# Patient Record
Sex: MALE | Race: BLACK OR AFRICAN AMERICAN | Employment: PART TIME | URBAN - METROPOLITAN AREA
[De-identification: names, ages, dates, MRNs, and addresses within clinical notes are randomized per-mention and may not be internally consistent; named-entity substitution may affect disease eponyms.]

---

## 2019-03-17 ENCOUNTER — HOSPITAL ENCOUNTER (EMERGENCY)
Age: 29
Discharge: HOME | End: 2019-03-17

## 2019-03-17 VITALS
HEIGHT: 73 IN | TEMPERATURE: 97.6 F | WEIGHT: 270 LBS | RESPIRATION RATE: 20 BRPM | BODY MASS INDEX: 35.78 KG/M2 | HEART RATE: 63 BPM | DIASTOLIC BLOOD PRESSURE: 95 MMHG | SYSTOLIC BLOOD PRESSURE: 141 MMHG | OXYGEN SATURATION: 98 %

## 2019-03-17 DIAGNOSIS — K02.9 PAIN DUE TO DENTAL CARIES: ICD-10-CM

## 2019-03-17 DIAGNOSIS — K08.89 PAIN, DENTAL: Primary | ICD-10-CM

## 2019-03-17 PROCEDURE — 99282 EMERGENCY DEPT VISIT SF MDM: CPT

## 2019-03-17 PROCEDURE — 6370000000 HC RX 637 (ALT 250 FOR IP): Performed by: NURSE PRACTITIONER

## 2019-03-17 RX ORDER — AMOXICILLIN 250 MG/1
500 CAPSULE ORAL ONCE
Status: COMPLETED | OUTPATIENT
Start: 2019-03-17 | End: 2019-03-17

## 2019-03-17 RX ORDER — IBUPROFEN 800 MG/1
800 TABLET ORAL EVERY 6 HOURS PRN
Qty: 20 TABLET | Refills: 3 | Status: SHIPPED | OUTPATIENT
Start: 2019-03-17 | End: 2022-03-10 | Stop reason: ALTCHOICE

## 2019-03-17 RX ORDER — IBUPROFEN 800 MG/1
800 TABLET ORAL ONCE
Status: COMPLETED | OUTPATIENT
Start: 2019-03-17 | End: 2019-03-17

## 2019-03-17 RX ORDER — AMOXICILLIN 500 MG/1
500 CAPSULE ORAL 3 TIMES DAILY
Qty: 21 CAPSULE | Refills: 0 | Status: SHIPPED | OUTPATIENT
Start: 2019-03-17 | End: 2019-03-24

## 2019-03-17 RX ADMIN — LIDOCAINE HYDROCHLORIDE 15 ML: 20 SOLUTION ORAL; TOPICAL at 06:13

## 2019-03-17 RX ADMIN — AMOXICILLIN 500 MG: 250 CAPSULE ORAL at 06:12

## 2019-03-17 RX ADMIN — IBUPROFEN 800 MG: 800 TABLET, FILM COATED ORAL at 05:57

## 2019-03-17 ASSESSMENT — PAIN SCALES - GENERAL
PAINLEVEL_OUTOF10: 10
PAINLEVEL_OUTOF10: 5
PAINLEVEL_OUTOF10: 4

## 2019-03-17 ASSESSMENT — PAIN DESCRIPTION - LOCATION
LOCATION: TEETH
LOCATION: TEETH

## 2019-03-17 ASSESSMENT — PAIN DESCRIPTION - PROGRESSION: CLINICAL_PROGRESSION: GRADUALLY IMPROVING

## 2019-03-17 ASSESSMENT — PAIN DESCRIPTION - PAIN TYPE: TYPE: ACUTE PAIN

## 2019-03-17 ASSESSMENT — PAIN DESCRIPTION - FREQUENCY: FREQUENCY: CONTINUOUS

## 2019-03-17 ASSESSMENT — PAIN DESCRIPTION - DESCRIPTORS
DESCRIPTORS: SHARP
DESCRIPTORS: SHARP;STABBING

## 2020-09-17 ENCOUNTER — ANESTHESIA (OUTPATIENT)
Dept: OPERATING ROOM | Age: 30
DRG: 395 | End: 2020-09-17

## 2020-09-17 ENCOUNTER — HOSPITAL ENCOUNTER (INPATIENT)
Age: 30
LOS: 2 days | Discharge: HOME | DRG: 395 | End: 2020-09-19
Attending: EMERGENCY MEDICINE | Admitting: SURGERY

## 2020-09-17 ENCOUNTER — ANESTHESIA EVENT (OUTPATIENT)
Dept: OPERATING ROOM | Age: 30
DRG: 395 | End: 2020-09-17

## 2020-09-17 ENCOUNTER — APPOINTMENT (OUTPATIENT)
Dept: CT IMAGING | Age: 30
DRG: 395 | End: 2020-09-17

## 2020-09-17 PROBLEM — Z76.89 ENCOUNTER FOR INCISION AND DRAINAGE PROCEDURE: Status: ACTIVE | Noted: 2020-09-17

## 2020-09-17 LAB
ANION GAP SERPL CALCULATED.3IONS-SCNC: 6 MMOL/L (ref 7–16)
BASOPHILS ABSOLUTE: 0.04 E9/L (ref 0–0.2)
BASOPHILS RELATIVE PERCENT: 0.3 % (ref 0–2)
BUN BLDV-MCNC: 15 MG/DL (ref 6–20)
CALCIUM SERPL-MCNC: 10.3 MG/DL (ref 8.6–10.2)
CHLORIDE BLD-SCNC: 102 MMOL/L (ref 98–107)
CO2: 29 MMOL/L (ref 22–29)
CREAT SERPL-MCNC: 1.6 MG/DL (ref 0.7–1.2)
EOSINOPHILS ABSOLUTE: 0.04 E9/L (ref 0.05–0.5)
EOSINOPHILS RELATIVE PERCENT: 0.3 % (ref 0–6)
GFR AFRICAN AMERICAN: >60
GFR NON-AFRICAN AMERICAN: >60 ML/MIN/1.73
GLUCOSE BLD-MCNC: 99 MG/DL (ref 74–99)
HCT VFR BLD CALC: 47.7 % (ref 37–54)
HEMOGLOBIN: 15.8 G/DL (ref 12.5–16.5)
IMMATURE GRANULOCYTES #: 0.06 E9/L
IMMATURE GRANULOCYTES %: 0.5 % (ref 0–5)
LYMPHOCYTES ABSOLUTE: 1.83 E9/L (ref 1.5–4)
LYMPHOCYTES RELATIVE PERCENT: 15.7 % (ref 20–42)
MCH RBC QN AUTO: 30.6 PG (ref 26–35)
MCHC RBC AUTO-ENTMCNC: 33.1 % (ref 32–34.5)
MCV RBC AUTO: 92.4 FL (ref 80–99.9)
MONOCYTES ABSOLUTE: 1.09 E9/L (ref 0.1–0.95)
MONOCYTES RELATIVE PERCENT: 9.4 % (ref 2–12)
NEUTROPHILS ABSOLUTE: 8.56 E9/L (ref 1.8–7.3)
NEUTROPHILS RELATIVE PERCENT: 73.8 % (ref 43–80)
PDW BLD-RTO: 12.8 FL (ref 11.5–15)
PLATELET # BLD: 176 E9/L (ref 130–450)
PMV BLD AUTO: 10.5 FL (ref 7–12)
POTASSIUM REFLEX MAGNESIUM: 4.6 MMOL/L (ref 3.5–5)
RBC # BLD: 5.16 E12/L (ref 3.8–5.8)
SODIUM BLD-SCNC: 137 MMOL/L (ref 132–146)
WBC # BLD: 11.6 E9/L (ref 4.5–11.5)

## 2020-09-17 PROCEDURE — 6360000004 HC RX CONTRAST MEDICATION: Performed by: RADIOLOGY

## 2020-09-17 PROCEDURE — 6360000002 HC RX W HCPCS: Performed by: STUDENT IN AN ORGANIZED HEALTH CARE EDUCATION/TRAINING PROGRAM

## 2020-09-17 PROCEDURE — 2580000003 HC RX 258: Performed by: STUDENT IN AN ORGANIZED HEALTH CARE EDUCATION/TRAINING PROGRAM

## 2020-09-17 PROCEDURE — 87040 BLOOD CULTURE FOR BACTERIA: CPT

## 2020-09-17 PROCEDURE — 80048 BASIC METABOLIC PNL TOTAL CA: CPT

## 2020-09-17 PROCEDURE — 96374 THER/PROPH/DIAG INJ IV PUSH: CPT

## 2020-09-17 PROCEDURE — 2580000003 HC RX 258: Performed by: SURGERY

## 2020-09-17 PROCEDURE — 99284 EMERGENCY DEPT VISIT MOD MDM: CPT

## 2020-09-17 PROCEDURE — 99285 EMERGENCY DEPT VISIT HI MDM: CPT

## 2020-09-17 PROCEDURE — 85025 COMPLETE CBC W/AUTO DIFF WBC: CPT

## 2020-09-17 PROCEDURE — 74177 CT ABD & PELVIS W/CONTRAST: CPT

## 2020-09-17 PROCEDURE — 1200000000 HC SEMI PRIVATE

## 2020-09-17 RX ORDER — SODIUM CHLORIDE 0.9 % (FLUSH) 0.9 %
10 SYRINGE (ML) INJECTION EVERY 12 HOURS SCHEDULED
Status: DISCONTINUED | OUTPATIENT
Start: 2020-09-17 | End: 2020-09-19

## 2020-09-17 RX ORDER — SODIUM CHLORIDE 9 MG/ML
INJECTION, SOLUTION INTRAVENOUS EVERY 8 HOURS
Status: DISCONTINUED | OUTPATIENT
Start: 2020-09-17 | End: 2020-09-19

## 2020-09-17 RX ORDER — SODIUM CHLORIDE 0.9 % (FLUSH) 0.9 %
10 SYRINGE (ML) INJECTION PRN
Status: DISCONTINUED | OUTPATIENT
Start: 2020-09-17 | End: 2020-09-19

## 2020-09-17 RX ORDER — ONDANSETRON 2 MG/ML
4 INJECTION INTRAMUSCULAR; INTRAVENOUS EVERY 6 HOURS PRN
Status: DISCONTINUED | OUTPATIENT
Start: 2020-09-17 | End: 2020-09-19

## 2020-09-17 RX ORDER — ACETAMINOPHEN 650 MG
TABLET, EXTENDED RELEASE ORAL PRN
Status: DISCONTINUED | OUTPATIENT
Start: 2020-09-17 | End: 2020-09-17

## 2020-09-17 RX ORDER — KETOROLAC TROMETHAMINE 30 MG/ML
30 INJECTION, SOLUTION INTRAMUSCULAR; INTRAVENOUS ONCE
Status: COMPLETED | OUTPATIENT
Start: 2020-09-17 | End: 2020-09-17

## 2020-09-17 RX ORDER — FENTANYL CITRATE 50 UG/ML
100 INJECTION, SOLUTION INTRAMUSCULAR; INTRAVENOUS
Status: DISCONTINUED | OUTPATIENT
Start: 2020-09-17 | End: 2020-09-17

## 2020-09-17 RX ORDER — OXYCODONE HYDROCHLORIDE 5 MG/1
5 TABLET ORAL EVERY 4 HOURS PRN
Status: DISCONTINUED | OUTPATIENT
Start: 2020-09-17 | End: 2020-09-19

## 2020-09-17 RX ORDER — ACETAMINOPHEN 325 MG/1
650 TABLET ORAL EVERY 4 HOURS PRN
Status: DISCONTINUED | OUTPATIENT
Start: 2020-09-17 | End: 2020-09-19 | Stop reason: HOSPADM

## 2020-09-17 RX ORDER — 0.9 % SODIUM CHLORIDE 0.9 %
1000 INTRAVENOUS SOLUTION INTRAVENOUS ONCE
Status: COMPLETED | OUTPATIENT
Start: 2020-09-17 | End: 2020-09-17

## 2020-09-17 RX ORDER — LIDOCAINE HYDROCHLORIDE AND EPINEPHRINE 10; 10 MG/ML; UG/ML
20 INJECTION, SOLUTION INFILTRATION; PERINEURAL ONCE
Status: DISCONTINUED | OUTPATIENT
Start: 2020-09-17 | End: 2020-09-17

## 2020-09-17 RX ORDER — OXYCODONE HYDROCHLORIDE 5 MG/1
10 TABLET ORAL EVERY 4 HOURS PRN
Status: DISCONTINUED | OUTPATIENT
Start: 2020-09-17 | End: 2020-09-19

## 2020-09-17 RX ADMIN — VANCOMYCIN HYDROCHLORIDE 3000 MG: 1 INJECTION, POWDER, LYOPHILIZED, FOR SOLUTION INTRAVENOUS at 22:48

## 2020-09-17 RX ADMIN — PIPERACILLIN AND TAZOBACTAM 3.38 G: 3; .375 INJECTION, POWDER, FOR SOLUTION INTRAVENOUS at 17:59

## 2020-09-17 RX ADMIN — SODIUM CHLORIDE 1000 ML: 9 INJECTION, SOLUTION INTRAVENOUS at 13:55

## 2020-09-17 RX ADMIN — SODIUM CHLORIDE: 9 INJECTION, SOLUTION INTRAVENOUS at 21:56

## 2020-09-17 RX ADMIN — IOPAMIDOL 110 ML: 755 INJECTION, SOLUTION INTRAVENOUS at 14:46

## 2020-09-17 RX ADMIN — KETOROLAC TROMETHAMINE 30 MG: 30 INJECTION, SOLUTION INTRAMUSCULAR at 13:55

## 2020-09-17 ASSESSMENT — PAIN DESCRIPTION - PROGRESSION: CLINICAL_PROGRESSION: NOT CHANGED

## 2020-09-17 ASSESSMENT — ENCOUNTER SYMPTOMS
ABDOMINAL PAIN: 0
SINUS PRESSURE: 0
NAUSEA: 0
COUGH: 0
EYE DISCHARGE: 0
BACK PAIN: 0
SHORTNESS OF BREATH: 0
COLOR CHANGE: 1
EYE PAIN: 0
WHEEZING: 0
VOMITING: 0
SORE THROAT: 0
EYE REDNESS: 0
DIARRHEA: 0

## 2020-09-17 ASSESSMENT — PAIN DESCRIPTION - ORIENTATION: ORIENTATION: MID

## 2020-09-17 ASSESSMENT — PAIN SCALES - GENERAL
PAINLEVEL_OUTOF10: 10
PAINLEVEL_OUTOF10: 5
PAINLEVEL_OUTOF10: 10

## 2020-09-17 ASSESSMENT — PAIN DESCRIPTION - PAIN TYPE
TYPE: ACUTE PAIN
TYPE: ACUTE PAIN

## 2020-09-17 ASSESSMENT — PAIN DESCRIPTION - FREQUENCY: FREQUENCY: INTERMITTENT

## 2020-09-17 ASSESSMENT — PAIN DESCRIPTION - DESCRIPTORS: DESCRIPTORS: ACHING;DISCOMFORT

## 2020-09-17 ASSESSMENT — PAIN - FUNCTIONAL ASSESSMENT: PAIN_FUNCTIONAL_ASSESSMENT: ACTIVITIES ARE NOT PREVENTED

## 2020-09-17 ASSESSMENT — LIFESTYLE VARIABLES: SMOKING_STATUS: 1

## 2020-09-17 ASSESSMENT — PAIN DESCRIPTION - LOCATION: LOCATION: RECTUM

## 2020-09-17 ASSESSMENT — PAIN DESCRIPTION - ONSET: ONSET: ON-GOING

## 2020-09-17 NOTE — H&P
GENERAL SURGERY  HISTORY AND PHYSICAL  9/17/2020    Chief Complaint   Patient presents with    Wound Check     groin, testical area       HPI  Yumiko Gomez is a 27 y.o. male who presents for evaluation of rectal pain. He states that 2 days ago he started to have pain between his rectum and scrotum. He describes the pain as pressure like without radiation. Denies any nausea or vomiting, fever, chills, changes in bowel habits. He does report that bowel movements have been painful since this began. He has never had anything like this before. He denies any blood or drainage from the site. He has never had any abdominal surgeries and is not on any anticoagulation. He has no family history of Crohn's, ulcerative colitis, or colon cancer. History reviewed. No pertinent past medical history. History reviewed. No pertinent surgical history. Prior to Admission medications    Medication Sig Start Date End Date Taking? Authorizing Provider   ibuprofen (ADVIL;MOTRIN) 800 MG tablet Take 1 tablet by mouth every 6 hours as needed for Pain 3/17/19 3/22/19  LOLA Domínguez CNP       No Known Allergies    History reviewed. No pertinent family history.     Social History     Tobacco Use    Smoking status: Current Some Day Smoker     Types: Cigarettes    Smokeless tobacco: Never Used    Tobacco comment: 1 pack per week   Substance Use Topics    Alcohol use: Yes     Comment: Socially    Drug use: Yes     Types: Marijuana         Review of Systems - History obtained from the patient  General ROS: negative  Psychological ROS: negative  Ophthalmic ROS: negative  ENT ROS: negative  Allergy and Immunology ROS: negative  Hematological and Lymphatic ROS: negative  Endocrine ROS: negative  Respiratory ROS: negative  Cardiovascular ROS: negative  Gastrointestinal ROS: As above  Genito-Urinary ROS: negative  Musculoskeletal ROS: negative  Neurological ROS: negative  Dermatological ROS: As above      PHYSICAL EXAM:    Vitals:    20 1748   BP: (!) 155/87   Pulse: 61   Resp: 16   Temp: 98 °F (36.7 °C)   SpO2: 98%       General Appearance:  awake, alert, oriented, in no acute distress  Head/face:  NCAT  Eyes:  No gross abnormalities. Lungs:  CTAB  Heart:  Normotensive and RR  Abdomen:  Soft, non-tender, non-distended  Extremities: Extremities warm to touch, pink, with no edema. Male Rectal:  Firm, indurated area, without fluctuance, anterior to anal canal at 12 o'clock position, about 7cm in diameter with no scrotal or anal involvement    LABS:  CBC  Recent Labs     20  1342   WBC 11.6*   HGB 15.8   HCT 47.7        BMP  Recent Labs     20  1342      K 4.6      CO2 29   BUN 15   CREATININE 1.6*   CALCIUM 10.3*     Liver Function  No results for input(s): AMYLASE, LIPASE, BILITOT, BILIDIR, AST, ALT, ALKPHOS, PROT, LABALBU in the last 72 hours. No results for input(s): LACTATE in the last 72 hours. No results for input(s): INR, PTT in the last 72 hours. Invalid input(s): PT    RADIOLOGY    Ct Abdomen Pelvis W Iv Contrast Additional Contrast? None    Result Date: 2020  Patient MRN:  [id number] : 1990 Age: 27 years Gender: Male Order Date:  2020 2:46 PM EXAM: CT ABDOMEN PELVIS W IV CONTRAST INDICATION:  concern for perineal/lower gluteal soft tissue infection. TECHNIQUE: Axial images from the lung bases to the proximal thighs with IV contrast, without oral contrast. Axial sagittal and coronal 2-D reconstructions in soft tissue windows. Limited axial lung windows. Dose reduction techniques without automated exposure control. Contrast is 110 mL Isovue-370 IV. Dose is total DLP 1241.46 MG Y CM. COMPARISON: None FINDINGS:  Lung bases: Clear. No effusion. Heart size normal. No hiatal hernia. ABDOMEN: The liver has minimal fatty change along the falciform ligament. Otherwise normal liver, gallbladder, pancreas, spleen, adrenal glands. The common bile duct measures 3 mm.  The

## 2020-09-17 NOTE — PROGRESS NOTES
Pharmacy Consultation Note  (Antibiotic Dosing and Monitoring)    Initial consult date: 20  Consulting physician: Dr. Ely Spurling  Drug: Vancomycin      Age/  Gender Height Weight IBW Dosing weight  Allergy Information   30 y.o./male 6' 4\" (193 cm) 260 lb (117.9 kg)     Ideal body weight: 86.8 kg (191 lb 5.7 oz)  Adjusted ideal body weight: 99.3 kg (218 lb 13 oz)  117.9 kg  Patient has no known allergies. Temp (24hrs), Av.4 °F (36.9 °C), Min:98.4 °F (36.9 °C), Max:98.4 °F (36.9 °C)          Date  WBC BUN SCr CrCl  (mL/min) Drug/Dose Time   Given Level(s)   (Time) Comments   20 11.6 15 1.6  95  Vancomycin 3000 mg IV x one then 1750 mg q12h                                           No intake or output data in the 24 hours ending 20 1731    Historical Cultures:  No results found for: ORG  No results for input(s): BC in the last 72 hours. Cultures:  available culture and sensitivity results were reviewed in [facility]    Assessment:  · 27 y.o. male has been initiated Vancomycin.   · Estimated CrCl = 95 mL/min  · Goal trough level = 15-20 mcg/mL    Plan:  · Will initiate vancomycin at a dose of 3000 mg x one then 1750 mg q12h  · Monitor renal function   · Clinical pharmacy to follow      Vania Oleary [id number] [facility] 2020 5:31 PM

## 2020-09-17 NOTE — ED NOTES
Spoke to [employer], verified sbar received. Pt chimed.      Katie Iyer, RN  09/17/20 [id number]

## 2020-09-17 NOTE — LETTER
[address] [place] [address]  Phone: [phone]    No name on file. September 19, 2020     Patient: Estella Yan   YOB: 1990   Date of Visit: 9/17/2020       To Whom It May Concern: It is my medical opinion that Camilla Fink should remain out of work until seen by Dr. Magaly Pablo in office next week. .    If you have any questions or concerns, please don't hesitate to call.     Sincerely,        Dr. Magaly Pablo

## 2020-09-17 NOTE — ED PROVIDER NOTES
[facility]  Department of Emergency Medicine     Written by: Perez Rodriguez DO  Patient Name: Genaro Archibald  Attending Provider: Chi Alonso DO  Admit Date: 2020 12:45 PM  MRN: [id number]                   : 1990        Chief Complaint   Patient presents with    Wound Check     groin, testical area    - Chief complaint    Patient is 28-year-old male no sniffing past medical history. Patient is presenting with 4-day history of swelling and pain to the base of his left gluteal cleft/perineal area. Patient states the pain began gradually on Saturday but continued to worsen throughout the week. He notes that there is some fullness and redness to the area as well. He denies any trauma to the area. Patient states that there is no discharge from area. In addition patient states that he has been tolerating food and drink well. Also denies any fevers, chills, nausea, vomiting, chest pain, cough or shortness of breath. The history is provided by the patient. No  was used. Review of Systems   Constitutional: Negative for chills and fever. HENT: Negative for ear pain, sinus pressure and sore throat. Eyes: Negative for pain, discharge and redness. Respiratory: Negative for cough, shortness of breath and wheezing. Cardiovascular: Negative for chest pain. Gastrointestinal: Negative for abdominal pain, diarrhea, nausea and vomiting. Genitourinary: Negative for dysuria and frequency. Musculoskeletal: Negative for arthralgias and back pain. Skin: Positive for color change. Negative for rash and wound. Swelling and pain to base of left gluteal cleft/perineal area   Neurological: Negative for weakness and headaches. Hematological: Negative for adenopathy. All other systems reviewed and are negative. Physical Exam  Vitals signs and nursing note reviewed. Constitutional:       Appearance: He is well-developed.    HENT: Head: Normocephalic and atraumatic. Eyes:      Conjunctiva/sclera: Conjunctivae normal.   Neck:      Musculoskeletal: Normal range of motion and neck supple. Cardiovascular:      Rate and Rhythm: Normal rate and regular rhythm. Heart sounds: Normal heart sounds. No murmur. Pulmonary:      Effort: Pulmonary effort is normal. No respiratory distress. Breath sounds: Normal breath sounds. No wheezing or rales. Abdominal:      General: Bowel sounds are normal.      Palpations: Abdomen is soft. Tenderness: There is no abdominal tenderness. There is no guarding or rebound. Musculoskeletal:         General: No tenderness or deformity. Skin:     General: Skin is warm and dry. Findings: Erythema present. Comments: 3 x 3 cm area of redness and swelling to the base of the left gluteal cleft/perineal area. No discharge expressed. No definitive area of fluctuance. Neurological:      Mental Status: He is alert and oriented to person, place, and time. Cranial Nerves: No cranial nerve deficit. Coordination: Coordination normal.          Procedures       MDM  Number of Diagnoses or Management Options  Perianal abscess:   Diagnosis management comments: Patient is 27-year-old male no symptom past medical history. Patient presented with perianal pain and swelling for the last few days. Vital signs stable on presentation. On physical exam area of induration and tenderness at the base of the gluteal cleft. No drainage noted. Lab work was obtained which demonstrated mildly elevated creatinine of 1.6 mild elevated leukocytosis of 11.9. CT scan of the abdomen pelvis with contrast is performed which demonstrated a 5 x 5 x 2 cm perianal abscess. Call placed to general surgery who came and evaluated patient. Recommendation was made to admit patient to surgical team.  Plan of care discussed with patient, all questions were answered.   Patient was agreement with admission and was admitted in stable condition. Amount and/or Complexity of Data Reviewed  Clinical lab tests: ordered and reviewed  Tests in the radiology section of CPT®: ordered and reviewed    Risk of Complications, Morbidity, and/or Mortality  Presenting problems: moderate  Diagnostic procedures: moderate  Management options: moderate    Patient Progress  Patient progress: stable       ED Course as of Sep 17 1815   Thu Sep 17, 2020   1631 Consult placed to general surgery, return call from Dr. Stacy Mckeon. General surgery resident to evaluate patient. [BP]      ED Course User Index  [BP] Sonny Jonas DO        --------------------------------------------- PAST HISTORY ---------------------------------------------  Past Medical History:  has no past medical history on file. Past Surgical History:  has no past surgical history on file. Social History:  reports that he has been smoking cigarettes. He has never used smokeless tobacco. He reports current alcohol use. He reports current drug use. Drug: Marijuana. Family History: family history is not on file. The patients home medications have been reviewed. Allergies: Patient has no known allergies.     -------------------------------------------------- RESULTS -------------------------------------------------    LABS:  Results for orders placed or performed during the hospital encounter of 09/17/20   CBC Auto Differential   Result Value Ref Range    WBC 11.6 (H) 4.5 - 11.5 E9/L    RBC 5.16 3.80 - 5.80 E12/L    Hemoglobin 15.8 12.5 - 16.5 g/dL    Hematocrit 47.7 37.0 - 54.0 %    MCV 92.4 80.0 - 99.9 fL    MCH 30.6 26.0 - 35.0 pg    MCHC 33.1 32.0 - 34.5 %    RDW 12.8 11.5 - 15.0 fL    Platelets 706 397 - 479 E9/L    MPV 10.5 7.0 - 12.0 fL    Neutrophils % 73.8 43.0 - 80.0 %    Immature Granulocytes % 0.5 0.0 - 5.0 %    Lymphocytes % 15.7 (L) 20.0 - 42.0 %    Monocytes % 9.4 2.0 - 12.0 %    Eosinophils % 0.3 0.0 - 6.0 %    Basophils % 0.3 0.0 - 2.0 %    Neutrophils changed    Counseling:  I have spoken with the patient and discussed todays results, in addition to providing specific details for the plan of care and counseling regarding the diagnosis and prognosis. Their questions are answered at this time and they are agreeable with the plan of admission.    --------------------------------- ADDITIONAL PROVIDER NOTES ---------------------------------  Consultations:  Time: 1631. Spoke with Dr. Saida Shetty. Discussed case. They will admit the patient. This patient's ED course included: a personal history and physicial examination    This patient has remained hemodynamically stable during their ED course. Diagnosis:  1. Perianal abscess        Disposition:  Patient's disposition: Admit to med/surg floor  Patient's condition is good. Patient was seen and evaluated by myself and my attending Светлана Rowley DO. Assessment and Plan discussed with attending provider, please see attestation for final plan of care.      DO Taz Escalante DO  Resident  09/17/20 [id number]

## 2020-09-18 VITALS — SYSTOLIC BLOOD PRESSURE: 126 MMHG | OXYGEN SATURATION: 100 % | TEMPERATURE: 98.6 F | DIASTOLIC BLOOD PRESSURE: 63 MMHG

## 2020-09-18 LAB
ANION GAP SERPL CALCULATED.3IONS-SCNC: 9 MMOL/L (ref 7–16)
BASOPHILS ABSOLUTE: 0.03 E9/L (ref 0–0.2)
BASOPHILS RELATIVE PERCENT: 0.3 % (ref 0–2)
BUN BLDV-MCNC: 15 MG/DL (ref 6–20)
CALCIUM SERPL-MCNC: 9.2 MG/DL (ref 8.6–10.2)
CHLORIDE BLD-SCNC: 103 MMOL/L (ref 98–107)
CO2: 24 MMOL/L (ref 22–29)
CREAT SERPL-MCNC: 1.5 MG/DL (ref 0.7–1.2)
EOSINOPHILS ABSOLUTE: 0.09 E9/L (ref 0.05–0.5)
EOSINOPHILS RELATIVE PERCENT: 0.8 % (ref 0–6)
GFR AFRICAN AMERICAN: >60
GFR NON-AFRICAN AMERICAN: >60 ML/MIN/1.73
GLUCOSE BLD-MCNC: 104 MG/DL (ref 74–99)
HCT VFR BLD CALC: 42.4 % (ref 37–54)
HEMOGLOBIN: 14.4 G/DL (ref 12.5–16.5)
IMMATURE GRANULOCYTES #: 0.07 E9/L
IMMATURE GRANULOCYTES %: 0.6 % (ref 0–5)
LYMPHOCYTES ABSOLUTE: 1.94 E9/L (ref 1.5–4)
LYMPHOCYTES RELATIVE PERCENT: 16.7 % (ref 20–42)
MCH RBC QN AUTO: 30.9 PG (ref 26–35)
MCHC RBC AUTO-ENTMCNC: 34 % (ref 32–34.5)
MCV RBC AUTO: 91 FL (ref 80–99.9)
MONOCYTES ABSOLUTE: 0.82 E9/L (ref 0.1–0.95)
MONOCYTES RELATIVE PERCENT: 7 % (ref 2–12)
NEUTROPHILS ABSOLUTE: 8.7 E9/L (ref 1.8–7.3)
NEUTROPHILS RELATIVE PERCENT: 74.6 % (ref 43–80)
PDW BLD-RTO: 12.8 FL (ref 11.5–15)
PLATELET # BLD: 164 E9/L (ref 130–450)
PMV BLD AUTO: 10.6 FL (ref 7–12)
POTASSIUM REFLEX MAGNESIUM: 3.9 MMOL/L (ref 3.5–5)
RBC # BLD: 4.66 E12/L (ref 3.8–5.8)
SODIUM BLD-SCNC: 136 MMOL/L (ref 132–146)
WBC # BLD: 11.7 E9/L (ref 4.5–11.5)

## 2020-09-18 PROCEDURE — 6360000002 HC RX W HCPCS: Performed by: STUDENT IN AN ORGANIZED HEALTH CARE EDUCATION/TRAINING PROGRAM

## 2020-09-18 PROCEDURE — 6370000000 HC RX 637 (ALT 250 FOR IP): Performed by: STUDENT IN AN ORGANIZED HEALTH CARE EDUCATION/TRAINING PROGRAM

## 2020-09-18 PROCEDURE — 85025 COMPLETE CBC W/AUTO DIFF WBC: CPT

## 2020-09-18 PROCEDURE — 2709999900 HC NON-CHARGEABLE SUPPLY: Performed by: SURGERY

## 2020-09-18 PROCEDURE — 36415 COLL VENOUS BLD VENIPUNCTURE: CPT

## 2020-09-18 PROCEDURE — 2580000003 HC RX 258: Performed by: NURSE ANESTHETIST, CERTIFIED REGISTERED

## 2020-09-18 PROCEDURE — 2500000003 HC RX 250 WO HCPCS: Performed by: SURGERY

## 2020-09-18 PROCEDURE — 2580000003 HC RX 258: Performed by: STUDENT IN AN ORGANIZED HEALTH CARE EDUCATION/TRAINING PROGRAM

## 2020-09-18 PROCEDURE — 6360000002 HC RX W HCPCS: Performed by: NURSE ANESTHETIST, CERTIFIED REGISTERED

## 2020-09-18 PROCEDURE — 3600000013 HC SURGERY LEVEL 3 ADDTL 15MIN: Performed by: SURGERY

## 2020-09-18 PROCEDURE — 7100000000 HC PACU RECOVERY - FIRST 15 MIN: Performed by: SURGERY

## 2020-09-18 PROCEDURE — 7100000001 HC PACU RECOVERY - ADDTL 15 MIN: Performed by: SURGERY

## 2020-09-18 PROCEDURE — 3700000001 HC ADD 15 MINUTES (ANESTHESIA): Performed by: SURGERY

## 2020-09-18 PROCEDURE — 3600000003 HC SURGERY LEVEL 3 BASE: Performed by: SURGERY

## 2020-09-18 PROCEDURE — 0D9Q3ZZ DRAINAGE OF ANUS, PERCUTANEOUS APPROACH: ICD-10-PCS | Performed by: STUDENT IN AN ORGANIZED HEALTH CARE EDUCATION/TRAINING PROGRAM

## 2020-09-18 PROCEDURE — 3700000000 HC ANESTHESIA ATTENDED CARE: Performed by: SURGERY

## 2020-09-18 PROCEDURE — 80048 BASIC METABOLIC PNL TOTAL CA: CPT

## 2020-09-18 PROCEDURE — 87081 CULTURE SCREEN ONLY: CPT

## 2020-09-18 PROCEDURE — 1200000000 HC SEMI PRIVATE

## 2020-09-18 PROCEDURE — 2500000003 HC RX 250 WO HCPCS: Performed by: NURSE ANESTHETIST, CERTIFIED REGISTERED

## 2020-09-18 PROCEDURE — 2580000003 HC RX 258: Performed by: SURGERY

## 2020-09-18 RX ORDER — LIDOCAINE HYDROCHLORIDE 20 MG/ML
INJECTION, SOLUTION EPIDURAL; INFILTRATION; INTRACAUDAL; PERINEURAL PRN
Status: DISCONTINUED | OUTPATIENT
Start: 2020-09-18 | End: 2020-09-18 | Stop reason: SDUPTHER

## 2020-09-18 RX ORDER — DIPHENHYDRAMINE HYDROCHLORIDE 50 MG/ML
12.5 INJECTION INTRAMUSCULAR; INTRAVENOUS
Status: DISCONTINUED | OUTPATIENT
Start: 2020-09-18 | End: 2020-09-18 | Stop reason: HOSPADM

## 2020-09-18 RX ORDER — BUPIVACAINE HYDROCHLORIDE 5 MG/ML
INJECTION, SOLUTION EPIDURAL; INTRACAUDAL PRN
Status: DISCONTINUED | OUTPATIENT
Start: 2020-09-18 | End: 2020-09-18 | Stop reason: ALTCHOICE

## 2020-09-18 RX ORDER — ONDANSETRON 2 MG/ML
INJECTION INTRAMUSCULAR; INTRAVENOUS PRN
Status: DISCONTINUED | OUTPATIENT
Start: 2020-09-18 | End: 2020-09-18 | Stop reason: SDUPTHER

## 2020-09-18 RX ORDER — KETAMINE HYDROCHLORIDE 10 MG/ML
INJECTION, SOLUTION INTRAMUSCULAR; INTRAVENOUS PRN
Status: DISCONTINUED | OUTPATIENT
Start: 2020-09-18 | End: 2020-09-18 | Stop reason: SDUPTHER

## 2020-09-18 RX ORDER — SODIUM CHLORIDE, SODIUM LACTATE, POTASSIUM CHLORIDE, CALCIUM CHLORIDE 600; 310; 30; 20 MG/100ML; MG/100ML; MG/100ML; MG/100ML
INJECTION, SOLUTION INTRAVENOUS CONTINUOUS PRN
Status: DISCONTINUED | OUTPATIENT
Start: 2020-09-18 | End: 2020-09-18 | Stop reason: SDUPTHER

## 2020-09-18 RX ORDER — GLYCOPYRROLATE 1 MG/5 ML
SYRINGE (ML) INTRAVENOUS PRN
Status: DISCONTINUED | OUTPATIENT
Start: 2020-09-18 | End: 2020-09-18 | Stop reason: SDUPTHER

## 2020-09-18 RX ORDER — DEXAMETHASONE SODIUM PHOSPHATE 4 MG/ML
INJECTION, SOLUTION INTRA-ARTICULAR; INTRALESIONAL; INTRAMUSCULAR; INTRAVENOUS; SOFT TISSUE PRN
Status: DISCONTINUED | OUTPATIENT
Start: 2020-09-18 | End: 2020-09-18 | Stop reason: SDUPTHER

## 2020-09-18 RX ORDER — MEPERIDINE HYDROCHLORIDE 25 MG/ML
12.5 INJECTION INTRAMUSCULAR; INTRAVENOUS; SUBCUTANEOUS EVERY 5 MIN PRN
Status: DISCONTINUED | OUTPATIENT
Start: 2020-09-18 | End: 2020-09-18 | Stop reason: HOSPADM

## 2020-09-18 RX ORDER — PROPOFOL 10 MG/ML
INJECTION, EMULSION INTRAVENOUS PRN
Status: DISCONTINUED | OUTPATIENT
Start: 2020-09-18 | End: 2020-09-18 | Stop reason: SDUPTHER

## 2020-09-18 RX ORDER — MIDAZOLAM HYDROCHLORIDE 1 MG/ML
INJECTION INTRAMUSCULAR; INTRAVENOUS PRN
Status: DISCONTINUED | OUTPATIENT
Start: 2020-09-18 | End: 2020-09-18 | Stop reason: SDUPTHER

## 2020-09-18 RX ORDER — FENTANYL CITRATE 50 UG/ML
INJECTION, SOLUTION INTRAMUSCULAR; INTRAVENOUS PRN
Status: DISCONTINUED | OUTPATIENT
Start: 2020-09-18 | End: 2020-09-18 | Stop reason: SDUPTHER

## 2020-09-18 RX ADMIN — PROPOFOL 400 MG: 10 INJECTION, EMULSION INTRAVENOUS at 12:30

## 2020-09-18 RX ADMIN — SODIUM CHLORIDE, POTASSIUM CHLORIDE, SODIUM LACTATE AND CALCIUM CHLORIDE: 600; 310; 30; 20 INJECTION, SOLUTION INTRAVENOUS at 12:00

## 2020-09-18 RX ADMIN — Medication 0.2 MG: at 12:30

## 2020-09-18 RX ADMIN — PIPERACILLIN AND TAZOBACTAM 3.38 G: 3; .375 INJECTION, POWDER, FOR SOLUTION INTRAVENOUS at 01:58

## 2020-09-18 RX ADMIN — FENTANYL CITRATE 50 MCG: 50 INJECTION, SOLUTION INTRAMUSCULAR; INTRAVENOUS at 12:30

## 2020-09-18 RX ADMIN — HYDROMORPHONE HYDROCHLORIDE 0.25 MG: 1 INJECTION, SOLUTION INTRAMUSCULAR; INTRAVENOUS; SUBCUTANEOUS at 09:49

## 2020-09-18 RX ADMIN — SODIUM CHLORIDE: 9 INJECTION, SOLUTION INTRAVENOUS at 21:03

## 2020-09-18 RX ADMIN — DEXAMETHASONE SODIUM PHOSPHATE 10 MG: 4 INJECTION, SOLUTION INTRAMUSCULAR; INTRAVENOUS at 12:39

## 2020-09-18 RX ADMIN — FENTANYL CITRATE 50 MCG: 50 INJECTION, SOLUTION INTRAMUSCULAR; INTRAVENOUS at 12:33

## 2020-09-18 RX ADMIN — LIDOCAINE HYDROCHLORIDE 100 MG: 20 INJECTION, SOLUTION EPIDURAL; INFILTRATION; INTRACAUDAL; PERINEURAL at 12:31

## 2020-09-18 RX ADMIN — OXYCODONE HYDROCHLORIDE 10 MG: 5 TABLET ORAL at 06:29

## 2020-09-18 RX ADMIN — SODIUM CHLORIDE: 9 INJECTION, SOLUTION INTRAVENOUS at 06:17

## 2020-09-18 RX ADMIN — PIPERACILLIN AND TAZOBACTAM 3.38 G: 3; .375 INJECTION, POWDER, FOR SOLUTION INTRAVENOUS at 17:37

## 2020-09-18 RX ADMIN — ACETAMINOPHEN 650 MG: 325 TABLET ORAL at 02:04

## 2020-09-18 RX ADMIN — PIPERACILLIN AND TAZOBACTAM 3.38 G: 3; .375 INJECTION, POWDER, FOR SOLUTION INTRAVENOUS at 09:43

## 2020-09-18 RX ADMIN — KETAMINE HYDROCHLORIDE 50 MG: 10 INJECTION INTRAMUSCULAR; INTRAVENOUS at 12:30

## 2020-09-18 RX ADMIN — MIDAZOLAM 2 MG: 1 INJECTION INTRAMUSCULAR; INTRAVENOUS at 12:31

## 2020-09-18 RX ADMIN — ONDANSETRON 4 MG: 2 INJECTION INTRAMUSCULAR; INTRAVENOUS at 12:44

## 2020-09-18 RX ADMIN — MIDAZOLAM 2 MG: 1 INJECTION INTRAMUSCULAR; INTRAVENOUS at 12:39

## 2020-09-18 ASSESSMENT — PULMONARY FUNCTION TESTS
PIF_VALUE: 0
PIF_VALUE: 1
PIF_VALUE: 0
PIF_VALUE: 1
PIF_VALUE: 0
PIF_VALUE: 0
PIF_VALUE: 1
PIF_VALUE: 1
PIF_VALUE: 0
PIF_VALUE: 0
PIF_VALUE: 1
PIF_VALUE: 0
PIF_VALUE: 1
PIF_VALUE: 0
PIF_VALUE: 0
PIF_VALUE: 1
PIF_VALUE: 0
PIF_VALUE: 0
PIF_VALUE: 1

## 2020-09-18 ASSESSMENT — PAIN DESCRIPTION - PROGRESSION: CLINICAL_PROGRESSION: NOT CHANGED

## 2020-09-18 ASSESSMENT — PAIN SCALES - GENERAL
PAINLEVEL_OUTOF10: 8
PAINLEVEL_OUTOF10: 0
PAINLEVEL_OUTOF10: 8
PAINLEVEL_OUTOF10: 7
PAINLEVEL_OUTOF10: 0

## 2020-09-18 ASSESSMENT — PAIN - FUNCTIONAL ASSESSMENT: PAIN_FUNCTIONAL_ASSESSMENT: PREVENTS OR INTERFERES SOME ACTIVE ACTIVITIES AND ADLS

## 2020-09-18 ASSESSMENT — PAIN DESCRIPTION - DESCRIPTORS: DESCRIPTORS: ACHING;DISCOMFORT

## 2020-09-18 ASSESSMENT — PAIN DESCRIPTION - ONSET: ONSET: SUDDEN

## 2020-09-18 ASSESSMENT — PAIN DESCRIPTION - PAIN TYPE: TYPE: ACUTE PAIN

## 2020-09-18 ASSESSMENT — PAIN DESCRIPTION - FREQUENCY: FREQUENCY: INTERMITTENT

## 2020-09-18 ASSESSMENT — PAIN DESCRIPTION - LOCATION: LOCATION: RECTUM

## 2020-09-18 ASSESSMENT — PAIN DESCRIPTION - ORIENTATION: ORIENTATION: LEFT;MID

## 2020-09-18 NOTE — PROGRESS NOTES
Pharmacy Consultation Note  (Antibiotic Dosing and Monitoring)    Initial consult date: 9/17  Consulting physician: Beka Newell  Drug(s): Vanco    Note vancomycin discontinued. Clinical pharmacy will sign-off. Please re-consult if we can be of further assistance.     Thanks for this consult,  Edith Riley [facility], PharmD, BCPS  9/18/2020  3:18 PM

## 2020-09-18 NOTE — PROGRESS NOTES
[facility] Quality Flow/Interdisciplinary Rounds Progress Note        Quality Flow Rounds held on September 18, 2020    Disciplines Attending:  Bedside Nurse, ,  and Nursing Unit Leadership    Russ Calvillo was admitted on 9/17/2020 12:45 PM    Anticipated Discharge Date:  Expected Discharge Date: 09/21/20    Disposition:    Aric Score:  Aric Scale Score: 21    Readmission Risk              Risk of Unplanned Readmission:        7           Discussed patient goal for the day, patient clinical progression, and barriers to discharge.   The following Goal(s) of the Day/Commitment(s) have been identified:  Diagnostics - Report Results and Labs - Report Results, OR today 9/18      Liang Elise  September 18, 2020

## 2020-09-18 NOTE — PROGRESS NOTES
GENERAL SURGERY  DAILY PROGRESS NOTE  9/18/2020    Chief Complaint   Patient presents with    Wound Check     groin, testical area       Subjective:  Pt states he has some pain with movement or trying to go to the bathroom. Objective:  BP (!) 171/96   Pulse 61   Temp 98.3 °F (36.8 °C) (Oral)   Resp 18   Ht 6' 4\" (1.93 m)   Wt 273 lb 7 oz (124 kg)   SpO2 96%   BMI 33.28 kg/m²     GENERAL:  Laying in bed, awake, alert, cooperative, no apparent distress  HEAD: Normocephalic, atraumatic  EYES: No sclera icterus, pupils equal  LUNGS:  No increased work of breathing  CARDIOVASCULAR:  RR  ABDOMEN:  Soft, non-tender, non-distended  EXTREMITIES: No edema or swelling  SKIN: Warm and dry  Male Rectal:  Firm, indurated area, without fluctuance, anterior to anal canal at 12 o'clock position, about 7cm in diameter with no scrotal or anal involvement    Assessment/Plan:  27 y.o. male with rectal pain, perianal lesion, and CT findings consistent with perianal abscess    - NPO for procedure today 9/18  - Plan for OR I&D today 9/18  - Pain control PRN  - Continue Vanc/Zosyn    Electronically signed by Rachele Severs, MD on 9/18/2020 at 6:00 AM     The patient was seen and examined and the chart was reviewed. The patient will go to the operating room today for incision and evacuation of a large perianal abscess.

## 2020-09-18 NOTE — OP NOTE
Operative Note      Patient: Lopez Alegria  YOB: 1990  MRN: [id number]    Date of Procedure: 9/18/2020    Pre-Op Diagnosis: PAIN    Post-Op Diagnosis: Same       Procedure(s):  RECTAL EXAM UNDER ANESTHESIA I & D  PERIANAL ABSCESS POSSIBLE DRAIN PLACEMENT    Surgeon(s):  Ameena Rios MD    Assistant:   Resident: Noreen Wayne MD    Anesthesia: General    Estimated Blood Loss (mL): Minimal    Complications: None    Specimens:   * No specimens in log *    Implants:  * No implants in log *      Drains: * No LDAs found *    Findings: None    Detailed Description of Procedure: The patient was brought to the operating room and positioned in left lateral decubitus on the OR table. Sequential compression devices were placed on the patient's lower extremities and functioning. Preoperative antibiotics were administered. MAC anesthesia was obtained without complication as per the anesthesia record. Immediately prior to the procedure a time-out was called and the surgical checklist was reviewed and agreed upon by all present. The patient was prepped and draped in the usual sterile fashion and the procedure went forth with strict aseptic technique under maximal barrier precautions. The area of induration was measured to be 7 cm x 3 cm. A 2 cm transverse incision was made using a 15 blade over the apex of the area of induration. Upon entering the cavity, blood began to drain. A hemostat was then used to probe the cavity and the associated tracks which continued to drain blood. Upon further drainage, the area immediately became less indurated. The cavity was probed again using hemostats to ensure adequate drainage of the entire area of induration. Electrocautery was used and excellent hemostasis was achieved upon exploration of the cavity. We then packed the cavity with 1/2 inch iodoform packing and covered the area with 4x4s and an ABD.  Mesh underwear was then placed on the patient for support of the

## 2020-09-18 NOTE — PROGRESS NOTES
Patient's vancomycin was finished and patient complaining about some itching on upper back, chest and arms. Patient's skin in those areas are pink. Areas cleansed with wipes. Patient states feels better will keep an eye on patient for further symptoms.

## 2020-09-18 NOTE — CARE COORDINATION
Social Work discharge planning   Pt off unit presently. BLANCHE called and lvm for pt's \"legal guardian\" (per Epic) Marco Merino [phone], and her # [phone] is not in service. Blanche also called [phone] and pt's second contact (his sister Jac Berman) at [phone], but only able to leave messages as well. Blanche will await contact with pt/guardian Waleska Durant. Electronically signed by Brenda Koch on 9/18/2020 at 10:34 AM     Addendum-    Blanche tried again to meet with pt for discharge planning. Pt remains off unit. No call back from guardian yet.    Electronically signed by Brenda Koch on 9/18/2020 at 11:47 AM

## 2020-09-18 NOTE — ANESTHESIA POSTPROCEDURE EVALUATION
Department of Anesthesiology  Postprocedure Note    Patient: Abby Cisneros  MRN: [id number]  YOB: 1990  Date of evaluation: 9/18/2020  Time:  2:18 PM     Procedure Summary     Date:  09/18/20 Room / Location:  [address]    Anesthesia Start:  1225 Anesthesia Stop:  4492    Procedures:       RECTAL EXAM UNDER ANESTHESIA I & D  PERIANAL ABSCESS POSSIBLE DRAIN PLACEMENT (N/A Anus)      RECTAL EXAM UNDER ANESTHESIA, I AND D PERIANAL ABSCESS Diagnosis:  (PAIN)    Surgeon:  Laila Sarkar MD Responsible Provider:  Nilson Valero MD    Anesthesia Type:  general ASA Status:  2          Anesthesia Type: general    Vielka Phase I: Vielka Score: 8    Vielka Phase II:      Last vitals: Reviewed and per EMR flowsheets.        Anesthesia Post Evaluation    Patient location during evaluation: PACU  Patient participation: complete - patient participated  Level of consciousness: awake and alert  Airway patency: patent  Nausea & Vomiting: no nausea and no vomiting  Complications: no  Cardiovascular status: hemodynamically stable  Respiratory status: acceptable  Hydration status: euvolemic

## 2020-09-18 NOTE — ANESTHESIA PRE PROCEDURE
Department of Anesthesiology  Preprocedure Note       Name:  Indira Rice   Age:  27 y.o.  :  1990                                          MRN:  [id number]         Date:  2020      Surgeon: Chastity Peguero    Procedure: RECTAL EXAM UN ANAND ANESTHESIA, I AND D PERIANAL ABSCESS    Medications prior to admission:   Prior to Admission medications    Medication Sig Start Date End Date Taking?  Authorizing Provider   ibuprofen (ADVIL;MOTRIN) 800 MG tablet Take 1 tablet by mouth every 6 hours as needed for Pain 3/17/19 3/22/19  LOLA Arboleda - CNP       Current medications:    Current Facility-Administered Medications   Medication Dose Route Frequency Provider Last Rate Last Dose    sodium chloride flush 0.9 % injection 10 mL  10 mL Intravenous 2 times per day Terri Solorzano MD        sodium chloride flush 0.9 % injection 10 mL  10 mL Intravenous PRN Terri Solorzano MD        enoxaparin (LOVENOX) injection 40 mg  40 mg Subcutaneous Daily Terri Solorzano MD        oxyCODONE (ROXICODONE) immediate release tablet 5 mg  5 mg Oral Q4H PRN Terri Solorzano MD        Or    oxyCODONE (ROXICODONE) immediate release tablet 10 mg  10 mg Oral Q4H PRN Terri Solorzano MD        ondansetron [facility]) injection 4 mg  4 mg Intravenous Q6H PRN Terri Solorzano MD        piperacillin-tazobactam (ZOSYN) 3.375 g in dextrose 5 % 50 mL IVPB extended infusion (mini-bag)  3.375 g Intravenous Q8H Terri Solorzano MD 12.5 mL/hr at 20 1759 3.375 g at 20 1759    0.9 % sodium chloride infusion   Intravenous Q8H Marquise Phan MD        [START ON 2020] vancomycin (VANCOCIN) 1,750 mg in dextrose 5 % 500 mL IVPB  1,750 mg Intravenous Q12H Terri Solorzano MD        HYDROmorphone (DILAUDID) injection 0.25 mg  0.25 mg Intravenous Q3H PRN Reji Perdomo MD        vancomycin (VANCOCIN) 3,000 mg in dextrose 5 % 600 mL IVPB  3,000 mg Intravenous Once Terri Solorzano MD        acetaminophen (TYLENOL) tablet 650 mg  650 mg Oral Q4H PRN Amber Contreras MD           Allergies:  No Known Allergies    Problem List:    Patient Active Problem List   Diagnosis Code    Encounter for incision and drainage procedure Z01.89       Past Medical History:  History reviewed. No pertinent past medical history. Past Surgical History:  History reviewed. No pertinent surgical history. Social History:    Social History     Tobacco Use    Smoking status: Current Some Day Smoker     Types: Cigarettes    Smokeless tobacco: Never Used    Tobacco comment: 1 pack per week   Substance Use Topics    Alcohol use: Yes     Comment: Socially                                Ready to quit: Not Answered  Counseling given: Not Answered  Comment: 1 pack per week      Vital Signs (Current):   Vitals:    09/17/20 1623 09/17/20 1748 09/17/20 1830 09/17/20 1945   BP: 139/82 (!) 155/87 (!) 177/97 (!) 171/96   Pulse:  61 52 61   Resp:  16 18 18   Temp:  98 °F (36.7 °C) 97.9 °F (36.6 °C) 98.3 °F (36.8 °C)   TempSrc:   Oral Oral   SpO2: 99% 98% 95% 96%   Weight:       Height:                                                  BP Readings from Last 3 Encounters:   09/17/20 (!) 171/96   03/17/19 (!) 141/95   12/05/16 (!) 165/89       NPO Status:                                                   Date of last liquid consumption: 09/17/20                        Date of last solid food consumption: 09/17/20    BMI:   Wt Readings from Last 3 Encounters:   09/17/20 260 lb (117.9 kg)   03/17/19 270 lb (122.5 kg)   12/05/16 260 lb (117.9 kg)     Body mass index is 31.65 kg/m².     CBC:   Lab Results   Component Value Date    WBC 11.6 09/17/2020    RBC 5.16 09/17/2020    HGB 15.8 09/17/2020    HCT 47.7 09/17/2020    MCV 92.4 09/17/2020    RDW 12.8 09/17/2020     09/17/2020       CMP:   Lab Results   Component Value Date     09/17/2020    K 4.6 09/17/2020     09/17/2020    CO2 29 09/17/2020    BUN 15 09/17/2020    CREATININE 1.6 09/17/2020    GFRAA >60 09/17/2020 confirmed. Anesthetic plan, risks, benefits, alternatives discussed with patient. Patient verbalized an understanding and agrees to proceed.      Saadia Hernandez MD  Staff Anesthesiologist  10:16 AM

## 2020-09-18 NOTE — PROGRESS NOTES
Spoke with residents regarding possible reaction to vancomycin with symptoms of itching and pink skin. Orders to hold next dose of vancomycin given.

## 2020-09-19 VITALS
HEART RATE: 50 BPM | SYSTOLIC BLOOD PRESSURE: 143 MMHG | HEIGHT: 76 IN | BODY MASS INDEX: 33.97 KG/M2 | WEIGHT: 279 LBS | RESPIRATION RATE: 16 BRPM | OXYGEN SATURATION: 97 % | DIASTOLIC BLOOD PRESSURE: 76 MMHG | TEMPERATURE: 97.7 F

## 2020-09-19 PROBLEM — K61.0 PERIANAL ABSCESS: Status: ACTIVE | Noted: 2020-09-19

## 2020-09-19 LAB
ANION GAP SERPL CALCULATED.3IONS-SCNC: 10 MMOL/L (ref 7–16)
BASOPHILS ABSOLUTE: 0.02 E9/L (ref 0–0.2)
BASOPHILS RELATIVE PERCENT: 0.1 % (ref 0–2)
BUN BLDV-MCNC: 16 MG/DL (ref 6–20)
CALCIUM SERPL-MCNC: 9.7 MG/DL (ref 8.6–10.2)
CHLORIDE BLD-SCNC: 102 MMOL/L (ref 98–107)
CO2: 23 MMOL/L (ref 22–29)
CREAT SERPL-MCNC: 1.3 MG/DL (ref 0.7–1.2)
EOSINOPHILS ABSOLUTE: 0 E9/L (ref 0.05–0.5)
EOSINOPHILS RELATIVE PERCENT: 0 % (ref 0–6)
GFR AFRICAN AMERICAN: >60
GFR NON-AFRICAN AMERICAN: >60 ML/MIN/1.73
GLUCOSE BLD-MCNC: 143 MG/DL (ref 74–99)
HCT VFR BLD CALC: 42.9 % (ref 37–54)
HEMOGLOBIN: 14.4 G/DL (ref 12.5–16.5)
IMMATURE GRANULOCYTES #: 0.08 E9/L
IMMATURE GRANULOCYTES %: 0.5 % (ref 0–5)
LYMPHOCYTES ABSOLUTE: 1.55 E9/L (ref 1.5–4)
LYMPHOCYTES RELATIVE PERCENT: 10.1 % (ref 20–42)
MCH RBC QN AUTO: 30.4 PG (ref 26–35)
MCHC RBC AUTO-ENTMCNC: 33.6 % (ref 32–34.5)
MCV RBC AUTO: 90.5 FL (ref 80–99.9)
MONOCYTES ABSOLUTE: 1.14 E9/L (ref 0.1–0.95)
MONOCYTES RELATIVE PERCENT: 7.4 % (ref 2–12)
MRSA CULTURE ONLY: NORMAL
NEUTROPHILS ABSOLUTE: 12.57 E9/L (ref 1.8–7.3)
NEUTROPHILS RELATIVE PERCENT: 81.9 % (ref 43–80)
PDW BLD-RTO: 12.4 FL (ref 11.5–15)
PLATELET # BLD: 200 E9/L (ref 130–450)
PMV BLD AUTO: 10.8 FL (ref 7–12)
POTASSIUM REFLEX MAGNESIUM: 4.2 MMOL/L (ref 3.5–5)
RBC # BLD: 4.74 E12/L (ref 3.8–5.8)
SODIUM BLD-SCNC: 135 MMOL/L (ref 132–146)
WBC # BLD: 15.4 E9/L (ref 4.5–11.5)

## 2020-09-19 PROCEDURE — 6370000000 HC RX 637 (ALT 250 FOR IP): Performed by: SURGERY

## 2020-09-19 PROCEDURE — 85025 COMPLETE CBC W/AUTO DIFF WBC: CPT

## 2020-09-19 PROCEDURE — 2580000003 HC RX 258: Performed by: STUDENT IN AN ORGANIZED HEALTH CARE EDUCATION/TRAINING PROGRAM

## 2020-09-19 PROCEDURE — 80048 BASIC METABOLIC PNL TOTAL CA: CPT

## 2020-09-19 PROCEDURE — 6360000002 HC RX W HCPCS: Performed by: STUDENT IN AN ORGANIZED HEALTH CARE EDUCATION/TRAINING PROGRAM

## 2020-09-19 PROCEDURE — 36415 COLL VENOUS BLD VENIPUNCTURE: CPT

## 2020-09-19 RX ORDER — HYDROCODONE BITARTRATE AND ACETAMINOPHEN 7.5; 325 MG/1; MG/1
1 TABLET ORAL EVERY 6 HOURS PRN
Status: DISCONTINUED | OUTPATIENT
Start: 2020-09-19 | End: 2020-09-19 | Stop reason: HOSPADM

## 2020-09-19 RX ORDER — HYDROCODONE BITARTRATE AND ACETAMINOPHEN 7.5; 325 MG/1; MG/1
1 TABLET ORAL EVERY 6 HOURS PRN
Qty: 12 TABLET | Refills: 0 | Status: SHIPPED | OUTPATIENT
Start: 2020-09-19 | End: 2020-09-22

## 2020-09-19 RX ORDER — AMOXICILLIN AND CLAVULANATE POTASSIUM 875; 125 MG/1; MG/1
1 TABLET, FILM COATED ORAL EVERY 12 HOURS SCHEDULED
Qty: 14 TABLET | Refills: 0 | Status: SHIPPED | OUTPATIENT
Start: 2020-09-19 | End: 2020-09-26

## 2020-09-19 RX ORDER — AMOXICILLIN AND CLAVULANATE POTASSIUM 875; 125 MG/1; MG/1
1 TABLET, FILM COATED ORAL EVERY 12 HOURS SCHEDULED
Status: DISCONTINUED | OUTPATIENT
Start: 2020-09-19 | End: 2020-09-19 | Stop reason: HOSPADM

## 2020-09-19 RX ADMIN — SODIUM CHLORIDE: 9 INJECTION, SOLUTION INTRAVENOUS at 06:17

## 2020-09-19 RX ADMIN — AMOXICILLIN AND CLAVULANATE POTASSIUM 1 TABLET: 875; 125 TABLET, FILM COATED ORAL at 09:17

## 2020-09-19 RX ADMIN — PIPERACILLIN AND TAZOBACTAM 3.38 G: 3; .375 INJECTION, POWDER, FOR SOLUTION INTRAVENOUS at 02:00

## 2020-09-19 ASSESSMENT — PAIN DESCRIPTION - PAIN TYPE: TYPE: ACUTE PAIN

## 2020-09-19 ASSESSMENT — PAIN SCALES - GENERAL: PAINLEVEL_OUTOF10: 3

## 2020-09-19 ASSESSMENT — PAIN DESCRIPTION - ORIENTATION: ORIENTATION: POSTERIOR

## 2020-09-19 NOTE — DISCHARGE SUMMARY
Surgery Discharge Summary    [id number] [facility] SUMMARY:                The patient is a 27 y.o. male who was admitted to the hospital on 9/17/2020 12:45 PM for treatment of perianal abscess. The patient underwent incision and drainage of a left perianal abscess on 9/18/2020. The course was uneventful. .  The patient was discharged on No discharge date for patient encounter. tolerating a diet, moving bowels, and urinating without difficulty. The incisions were clean and intact. The patient was discharged to home in satisfactory condition with instructions to call for a follow up appointment. Hospital Problem List:  Active Problems:    Encounter for incision and drainage procedure    Perianal abscess  Resolved Problems:    * No resolved hospital problems. *     Procedure(s) (LRB):  RECTAL EXAM UNDER ANESTHESIA I & D  PERIANAL ABSCESS POSSIBLE DRAIN PLACEMENT (N/A)    Discharge Medications:    Adele Sterling   Home Medication Instructions TXA:[id number]    Printed on:09/19/20 0579   Medication Information                      amoxicillin-clavulanate (AUGMENTIN) 875-125 MG per tablet  Take 1 tablet by mouth every 12 hours for 7 days             HYDROcodone-acetaminophen (NORCO) 7.5-325 MG per tablet  Take 1 tablet by mouth every 6 hours as needed for Pain for up to 3 days.              ibuprofen (ADVIL;MOTRIN) 800 MG tablet  Take 1 tablet by mouth every 6 hours as needed for Pain                 Vicente Haines  9/19/2020

## 2020-09-19 NOTE — PLAN OF CARE
Problem: Pain:  Description: Pain management should include both nonpharmacologic and pharmacologic interventions.   Goal: Control of acute pain  Description: Control of acute pain  Outcome: Met This Shift     Problem: Skin Integrity:  Goal: Absence of new skin breakdown  Description: Absence of new skin breakdown  Outcome: Met This Shift

## 2020-09-19 NOTE — PROGRESS NOTES
Physician Progress Note      PATIENT:               Johnnie Briscoe  CSN #:                  [id number]  :                       1990  ADMIT DATE:       2020 12:45 PM  [address] [place] DATE:        2020 9:37 AM  RESPONDING  PROVIDER #:        Jorge Carrillo MD          QUERY TEXT:    Dear Consulting Provider,    Patient admitted with perianal abscess. Per Op note dated  documentation   of I&D. To accurately reflect the procedure performed please further specify the depth   of tissue incised and drained: The medical record reflects the following:  Risk Factors: perianal abscess  Clinical Indicators: Per surgery in OP note , \"The area of induration was   measured to be 7 cm x 3 cm. A 2 cm transverse incision was made using a 15   blade over the apex of the area of induration. Maty Long Maty Long \"  Treatment: incision and drainage with packing    Nellie Sánchez RN, CCDS, SMART  Clinical Documentation Improvement  [phone]  Options provided:  -- Skin only  -- Subcutaneous tissue  -- Fascia  -- Other - I will add my own diagnosis  -- Disagree - Not applicable / Not valid  -- Disagree - Clinically unable to determine / Unknown  -- Refer to Clinical Documentation Reviewer    PROVIDER RESPONSE TEXT:    Addendum to  procedure note: This depth of the drainage to perianal   abscess was skin only.     Query created by: Marilu Montano on 2020 4:23 PM      Electronically signed by:  Jorge Carrillo MD 2020 10:37 AM

## 2020-09-22 LAB
BLOOD CULTURE, ROUTINE: NORMAL
CULTURE, BLOOD 2: NORMAL

## 2020-10-17 PROBLEM — Z76.89 ENCOUNTER FOR INCISION AND DRAINAGE PROCEDURE: Status: RESOLVED | Noted: 2020-09-17 | Resolved: 2020-10-17

## 2021-04-09 ENCOUNTER — HOSPITAL ENCOUNTER (EMERGENCY)
Age: 31
Discharge: HOME | End: 2021-04-09
Attending: EMERGENCY MEDICINE
Payer: COMMERCIAL

## 2021-04-09 ENCOUNTER — APPOINTMENT (OUTPATIENT)
Dept: CT IMAGING | Age: 31
End: 2021-04-09
Payer: COMMERCIAL

## 2021-04-09 VITALS
DIASTOLIC BLOOD PRESSURE: 101 MMHG | SYSTOLIC BLOOD PRESSURE: 164 MMHG | HEART RATE: 72 BPM | RESPIRATION RATE: 16 BRPM | OXYGEN SATURATION: 99 % | TEMPERATURE: 97.5 F

## 2021-04-09 DIAGNOSIS — L03.317 CELLULITIS OF BUTTOCK: Primary | ICD-10-CM

## 2021-04-09 LAB
ALBUMIN SERPL-MCNC: 4.4 G/DL (ref 3.5–5.2)
ALP BLD-CCNC: 89 U/L (ref 40–129)
ALT SERPL-CCNC: 51 U/L (ref 0–40)
ANION GAP SERPL CALCULATED.3IONS-SCNC: 9 MMOL/L (ref 7–16)
AST SERPL-CCNC: 42 U/L (ref 0–39)
BASOPHILS ABSOLUTE: 0.06 E9/L (ref 0–0.2)
BASOPHILS RELATIVE PERCENT: 0.5 % (ref 0–2)
BILIRUB SERPL-MCNC: 0.3 MG/DL (ref 0–1.2)
BILIRUBIN URINE: NEGATIVE
BLOOD, URINE: NEGATIVE
BUN BLDV-MCNC: 10 MG/DL (ref 6–20)
CALCIUM SERPL-MCNC: 9.9 MG/DL (ref 8.6–10.2)
CHLORIDE BLD-SCNC: 104 MMOL/L (ref 98–107)
CLARITY: CLEAR
CO2: 26 MMOL/L (ref 22–29)
COLOR: YELLOW
CREAT SERPL-MCNC: 1.3 MG/DL (ref 0.7–1.2)
EOSINOPHILS ABSOLUTE: 0.15 E9/L (ref 0.05–0.5)
EOSINOPHILS RELATIVE PERCENT: 1.2 % (ref 0–6)
GFR AFRICAN AMERICAN: >60
GFR NON-AFRICAN AMERICAN: >60 ML/MIN/1.73
GLUCOSE BLD-MCNC: 95 MG/DL (ref 74–99)
GLUCOSE URINE: NEGATIVE MG/DL
HCT VFR BLD CALC: 47.1 % (ref 37–54)
HEMOGLOBIN: 16.1 G/DL (ref 12.5–16.5)
IMMATURE GRANULOCYTES #: 0.09 E9/L
IMMATURE GRANULOCYTES %: 0.7 % (ref 0–5)
KETONES, URINE: NEGATIVE MG/DL
LEUKOCYTE ESTERASE, URINE: NEGATIVE
LYMPHOCYTES ABSOLUTE: 2.78 E9/L (ref 1.5–4)
LYMPHOCYTES RELATIVE PERCENT: 22.3 % (ref 20–42)
MCH RBC QN AUTO: 30.6 PG (ref 26–35)
MCHC RBC AUTO-ENTMCNC: 34.2 % (ref 32–34.5)
MCV RBC AUTO: 89.5 FL (ref 80–99.9)
MONOCYTES ABSOLUTE: 0.99 E9/L (ref 0.1–0.95)
MONOCYTES RELATIVE PERCENT: 7.9 % (ref 2–12)
NEUTROPHILS ABSOLUTE: 8.4 E9/L (ref 1.8–7.3)
NEUTROPHILS RELATIVE PERCENT: 67.4 % (ref 43–80)
NITRITE, URINE: NEGATIVE
PDW BLD-RTO: 13 FL (ref 11.5–15)
PH UA: 7 (ref 5–9)
PLATELET # BLD: 174 E9/L (ref 130–450)
PMV BLD AUTO: 10.3 FL (ref 7–12)
POTASSIUM REFLEX MAGNESIUM: 4.3 MMOL/L (ref 3.5–5)
PROTEIN UA: NEGATIVE MG/DL
RBC # BLD: 5.26 E12/L (ref 3.8–5.8)
SODIUM BLD-SCNC: 139 MMOL/L (ref 132–146)
SPECIFIC GRAVITY UA: 1.01 (ref 1–1.03)
TOTAL PROTEIN: 7.6 G/DL (ref 6.4–8.3)
UROBILINOGEN, URINE: 1 E.U./DL
WBC # BLD: 12.5 E9/L (ref 4.5–11.5)

## 2021-04-09 PROCEDURE — 80053 COMPREHEN METABOLIC PANEL: CPT

## 2021-04-09 PROCEDURE — 85025 COMPLETE CBC W/AUTO DIFF WBC: CPT

## 2021-04-09 PROCEDURE — 81003 URINALYSIS AUTO W/O SCOPE: CPT

## 2021-04-09 PROCEDURE — 99284 EMERGENCY DEPT VISIT MOD MDM: CPT

## 2021-04-09 PROCEDURE — 6360000004 HC RX CONTRAST MEDICATION: Performed by: RADIOLOGY

## 2021-04-09 PROCEDURE — 72193 CT PELVIS W/DYE: CPT

## 2021-04-09 PROCEDURE — 6370000000 HC RX 637 (ALT 250 FOR IP): Performed by: EMERGENCY MEDICINE

## 2021-04-09 RX ORDER — SULFAMETHOXAZOLE AND TRIMETHOPRIM 800; 160 MG/1; MG/1
2 TABLET ORAL 2 TIMES DAILY
Qty: 28 TABLET | Refills: 0 | Status: SHIPPED | OUTPATIENT
Start: 2021-04-09 | End: 2021-04-16

## 2021-04-09 RX ORDER — CEPHALEXIN 500 MG/1
500 CAPSULE ORAL EVERY 6 HOURS
Qty: 28 CAPSULE | Refills: 0 | Status: SHIPPED | OUTPATIENT
Start: 2021-04-09 | End: 2021-04-16

## 2021-04-09 RX ORDER — CEPHALEXIN 500 MG/1
500 CAPSULE ORAL ONCE
Status: COMPLETED | OUTPATIENT
Start: 2021-04-09 | End: 2021-04-09

## 2021-04-09 RX ORDER — SULFAMETHOXAZOLE AND TRIMETHOPRIM 800; 160 MG/1; MG/1
2 TABLET ORAL ONCE
Status: COMPLETED | OUTPATIENT
Start: 2021-04-09 | End: 2021-04-09

## 2021-04-09 RX ADMIN — SULFAMETHOXAZOLE AND TRIMETHOPRIM 2 TABLET: 800; 160 TABLET ORAL at 21:14

## 2021-04-09 RX ADMIN — CEPHALEXIN 500 MG: 500 CAPSULE ORAL at 21:14

## 2021-04-09 RX ADMIN — IOPAMIDOL 75 ML: 755 INJECTION, SOLUTION INTRAVENOUS at 19:03

## 2021-04-09 ASSESSMENT — PAIN DESCRIPTION - PAIN TYPE: TYPE: ACUTE PAIN

## 2021-04-09 ASSESSMENT — PAIN DESCRIPTION - FREQUENCY: FREQUENCY: CONTINUOUS

## 2021-04-09 NOTE — ED PROVIDER NOTES
This is a 70-year-old -American male with history of perianal abscess, presented to the emergency department for recurrent abscess. Patient had abscess drainage done operatively by Dr. Rubens Harris on 9/18/2020. He has been doing well since then, 2 weeks ago he noticed a small bump that resolved, then 2 days ago he noticed increased swelling, pain on the left side of his intergluteal region in the same place as his previous abscess. He denies any fevers, chills, difficulty or pain with defecation. He denies any abdominal pain, nausea, vomiting. Denies any history of IV drug use, denies any history of MRSA infection. The pain is constant, achy, worse with sitting, moderate in severity, partially relieved by over-the-counter medications. The history is provided by the patient and medical records. Review of Systems   Constitutional: Negative for chills and fever. HENT: Negative for trouble swallowing. Eyes: Negative for visual disturbance. Respiratory: Negative for cough and shortness of breath. Cardiovascular: Negative for chest pain and leg swelling. Gastrointestinal: Negative for abdominal pain, constipation, diarrhea, nausea and vomiting. Genitourinary: Negative for dysuria. Musculoskeletal: Negative for neck pain. Skin: Negative for rash. Neurological: Negative for syncope, weakness, light-headedness and numbness. Physical Exam  Vitals signs and nursing note reviewed. Constitutional:       Appearance: He is not ill-appearing or diaphoretic. Comments: Laying in bed, comfortable. HENT:      Head: Normocephalic and atraumatic. Right Ear: External ear normal.      Left Ear: External ear normal.   Eyes:      Extraocular Movements: Extraocular movements intact. Conjunctiva/sclera: Conjunctivae normal.   Neck:      Musculoskeletal: Normal range of motion and neck supple. Cardiovascular:      Rate and Rhythm: Normal rate and regular rhythm.       Pulses: Normal pulses. Heart sounds: Normal heart sounds. Pulmonary:      Effort: Pulmonary effort is normal. No respiratory distress. Breath sounds: Normal breath sounds. No wheezing, rhonchi or rales. Chest:      Chest wall: No tenderness. Abdominal:      General: Abdomen is flat. There is no distension. Palpations: Abdomen is soft. Tenderness: There is no abdominal tenderness. There is no guarding or rebound. Genitourinary:     Comments: 6 x 4 cm indurated region left inner gluteal fold, very tender to palpation, not involving the external anal sphincter on palpation. Musculoskeletal:         General: No swelling or deformity. Right lower leg: No edema. Left lower leg: No edema. Skin:     General: Skin is warm and dry. Capillary Refill: Capillary refill takes less than 2 seconds. Comments: No palpable crepitus, no signs of Simon's gangrene   Neurological:      General: No focal deficit present. Mental Status: He is alert. Motor: No weakness. Psychiatric:         Mood and Affect: Mood normal.         Behavior: Behavior normal.         Thought Content: Thought content normal.          Procedures     MDM  Number of Diagnoses or Management Options  Cellulitis of buttock  Diagnosis management comments: This is a 35-year-old -American male with history of perianal abscess presented to the emergency department for concern of recurrent abscess. In September 2020 patient had perianal abscess that was worked up in the emergency department, identified on CT imaging, he was admitted to the hospital where abscess was incised and drained by Dr. Gopi Marie. Patient has no history of MRSA, denies any history of IV drug use. He is afebrile, has no systemic signs or symptoms of infection.  Given concern for possible recurrent abscess, necessity of characterizing the extensive involvement of the abscess, CT of the pelvis was obtained which showed findings consistent with superficial cellulitis but no abscess formation. Patient was given dose of Keflex, Bactrim for MRSA coverage, was prescribed course of Keflex and Bactrim. Patient was advised to follow-up with his surgeon Dr. Urbano Claire, to immediately return to the emergency department with any signs or symptoms of systemic infection or any new or worsening symptoms. Amount and/or Complexity of Data Reviewed  Decide to obtain previous medical records or to obtain history from someone other than the patient: yes         ED Course as of Apr 10 0323   Fri Apr 09, 2021   2055 Patient had area of induration in left gluteal fold, no crepitus, no active drainage, tenderness to palpation, no involvement of perineum. No evidence of Simon's. [JA]      ED Course User Index  [JUDY] Marisol Bustos MD        ED Course as of Apr 10 0323   Fri Apr 09, 2021   2055 Patient had area of induration in left gluteal fold, no crepitus, no active drainage, tenderness to palpation, no involvement of perineum. No evidence of Simon's. [JA]      ED Course User Index  [JUDY] Marisol Bustos MD       --------------------------------------------- PAST HISTORY ---------------------------------------------  Past Medical History:  has no past medical history on file. Past Surgical History:  has a past surgical history that includes Anus surgery (N/A, 9/18/2020). Social History:  reports that he has been smoking cigarettes. He has never used smokeless tobacco. He reports current alcohol use. He reports current drug use. Drug: Marijuana. Family History: family history is not on file. The patients home medications have been reviewed.     Allergies: Vancomycin    -------------------------------------------------- RESULTS -------------------------------------------------  Labs:  Results for orders placed or performed during the hospital encounter of 04/09/21   CBC Auto Differential   Result Value Ref Range    WBC 12.5 (H) 4.5 - 11.5 E9/L    RBC 5.26 3.80 - 5.80 E12/L    Hemoglobin 16.1 12.5 - 16.5 g/dL    Hematocrit 47.1 37.0 - 54.0 %    MCV 89.5 80.0 - 99.9 fL    MCH 30.6 26.0 - 35.0 pg    MCHC 34.2 32.0 - 34.5 %    RDW 13.0 11.5 - 15.0 fL    Platelets 357 738 - 385 E9/L    MPV 10.3 7.0 - 12.0 fL    Neutrophils % 67.4 43.0 - 80.0 %    Immature Granulocytes % 0.7 0.0 - 5.0 %    Lymphocytes % 22.3 20.0 - 42.0 %    Monocytes % 7.9 2.0 - 12.0 %    Eosinophils % 1.2 0.0 - 6.0 %    Basophils % 0.5 0.0 - 2.0 %    Neutrophils Absolute 8.40 (H) 1.80 - 7.30 E9/L    Immature Granulocytes # 0.09 E9/L    Lymphocytes Absolute 2.78 1.50 - 4.00 E9/L    Monocytes Absolute 0.99 (H) 0.10 - 0.95 E9/L    Eosinophils Absolute 0.15 0.05 - 0.50 E9/L    Basophils Absolute 0.06 0.00 - 0.20 E9/L   Comprehensive Metabolic Panel w/ Reflex to MG   Result Value Ref Range    Sodium 139 132 - 146 mmol/L    Potassium reflex Magnesium 4.3 3.5 - 5.0 mmol/L    Chloride 104 98 - 107 mmol/L    CO2 26 22 - 29 mmol/L    Anion Gap 9 7 - 16 mmol/L    Glucose 95 74 - 99 mg/dL    BUN 10 6 - 20 mg/dL    CREATININE 1.3 (H) 0.7 - 1.2 mg/dL    GFR Non-African American >60 >=60 mL/min/1.73    GFR African American >60     Calcium 9.9 8.6 - 10.2 mg/dL    Total Protein 7.6 6.4 - 8.3 g/dL    Albumin 4.4 3.5 - 5.2 g/dL    Total Bilirubin 0.3 0.0 - 1.2 mg/dL    Alkaline Phosphatase 89 40 - 129 U/L    ALT 51 (H) 0 - 40 U/L    AST 42 (H) 0 - 39 U/L   Urinalysis, reflex to microscopic   Result Value Ref Range    Color, UA Yellow Straw/Yellow    Clarity, UA Clear Clear    Glucose, Ur Negative Negative mg/dL    Bilirubin Urine Negative Negative    Ketones, Urine Negative Negative mg/dL    Specific Gravity, UA 1.015 1.005 - 1.030    Blood, Urine Negative Negative    pH, UA 7.0 5.0 - 9.0    Protein, UA Negative Negative mg/dL    Urobilinogen, Urine 1.0 <2.0 E.U./dL    Nitrite, Urine Negative Negative    Leukocyte Esterase, Urine Negative Negative       Radiology:  CT PELVIS W CONTRAST Additional Contrast? R-0Print             Diagnosis:  1. Cellulitis of buttock        Disposition:  Patient's disposition: Discharge to home  Patient's condition is stable.            [address] Mancini DO  Resident  04/10/21 0265

## 2021-04-10 ASSESSMENT — ENCOUNTER SYMPTOMS
TROUBLE SWALLOWING: 0
VOMITING: 0
COUGH: 0
CONSTIPATION: 0
SHORTNESS OF BREATH: 0
NAUSEA: 0
DIARRHEA: 0
ABDOMINAL PAIN: 0

## 2021-09-13 ENCOUNTER — HOSPITAL ENCOUNTER (EMERGENCY)
Age: 31
Discharge: HOME | End: 2021-09-13
Attending: EMERGENCY MEDICINE
Payer: COMMERCIAL

## 2021-09-13 VITALS
SYSTOLIC BLOOD PRESSURE: 142 MMHG | HEART RATE: 78 BPM | TEMPERATURE: 97.1 F | WEIGHT: 280 LBS | RESPIRATION RATE: 14 BRPM | DIASTOLIC BLOOD PRESSURE: 80 MMHG | OXYGEN SATURATION: 98 % | HEIGHT: 76 IN | BODY MASS INDEX: 34.1 KG/M2

## 2021-09-13 DIAGNOSIS — L02.214 GROIN ABSCESS: Primary | ICD-10-CM

## 2021-09-13 PROCEDURE — 99283 EMERGENCY DEPT VISIT LOW MDM: CPT

## 2021-09-13 PROCEDURE — 6370000000 HC RX 637 (ALT 250 FOR IP): Performed by: EMERGENCY MEDICINE

## 2021-09-13 PROCEDURE — 2500000003 HC RX 250 WO HCPCS: Performed by: EMERGENCY MEDICINE

## 2021-09-13 PROCEDURE — 10060 I&D ABSCESS SIMPLE/SINGLE: CPT

## 2021-09-13 RX ORDER — LIDOCAINE HYDROCHLORIDE AND EPINEPHRINE 10; 10 MG/ML; UG/ML
20 INJECTION, SOLUTION INFILTRATION; PERINEURAL ONCE
Status: COMPLETED | OUTPATIENT
Start: 2021-09-13 | End: 2021-09-13

## 2021-09-13 RX ORDER — SULFAMETHOXAZOLE AND TRIMETHOPRIM 800; 160 MG/1; MG/1
2 TABLET ORAL 2 TIMES DAILY
Qty: 40 TABLET | Refills: 0 | Status: SHIPPED | OUTPATIENT
Start: 2021-09-13 | End: 2021-09-23

## 2021-09-13 RX ORDER — SULFAMETHOXAZOLE AND TRIMETHOPRIM 800; 160 MG/1; MG/1
1 TABLET ORAL ONCE
Status: COMPLETED | OUTPATIENT
Start: 2021-09-13 | End: 2021-09-13

## 2021-09-13 RX ADMIN — LIDOCAINE HYDROCHLORIDE,EPINEPHRINE BITARTRATE 20 ML: 10; .01 INJECTION, SOLUTION INFILTRATION; PERINEURAL at 15:40

## 2021-09-13 RX ADMIN — SULFAMETHOXAZOLE AND TRIMETHOPRIM 1 TABLET: 800; 160 TABLET ORAL at 15:38

## 2021-09-13 ASSESSMENT — ENCOUNTER SYMPTOMS
BACK PAIN: 0
SINUS PRESSURE: 0
ABDOMINAL PAIN: 0
VOMITING: 0
WHEEZING: 0
NAUSEA: 0
SHORTNESS OF BREATH: 0
EYE REDNESS: 0
COUGH: 0
EYE PAIN: 0
DIARRHEA: 0
SORE THROAT: 0
EYE DISCHARGE: 0

## 2021-09-13 ASSESSMENT — PAIN DESCRIPTION - LOCATION: LOCATION: LEG

## 2021-09-13 ASSESSMENT — PAIN SCALES - GENERAL
PAINLEVEL_OUTOF10: 8
PAINLEVEL_OUTOF10: 8

## 2021-09-13 ASSESSMENT — PAIN DESCRIPTION - ORIENTATION: ORIENTATION: LEFT

## 2021-09-13 NOTE — ED PROVIDER NOTES
Normal pulses. Heart sounds: Normal heart sounds. Pulmonary:      Effort: Pulmonary effort is normal.      Breath sounds: Normal breath sounds. Abdominal:      General: Abdomen is flat. Bowel sounds are normal. There is no distension. Palpations: Abdomen is soft. Tenderness: There is no abdominal tenderness. There is no guarding. Musculoskeletal:         General: Normal range of motion. Skin:     Findings: Abscess present. Neurological:      Mental Status: He is alert. Procedures   ABSCESS BEDSIDE ULTRASOUND     Risks, benefits and alternatives (for applicable procedures below) described. Performed By: Falguni Saucedo DO. Indication:  Pain, deformity. Informed consent: The patient provided verbal consent for this procedure. .  Procedural Quadrants:     REGION: Groin ultrasound shows likely abscess/fluid collection    This procedure was performed by Falguni Saucedo DO on 9/13/21 at 1:56 PM     PROCEDURE  9/13/21       Time: 13:55    INCISION AND DRAINAGE  Risks, benefits and alternatives (for applicable procedures below) described. Performed By: Falguni Saucedo DO. Indication: groin abscess  Informed consent obtained: The patient provided verbal consent for this procedure. .  Prep: The skin was wiped with isopropyl alcohol and draped in a sterile fashion. Local Anesthesia:  obtained with Lidocaine 1% with epinephrine. Incision: The Abscess located on groin was Incised by scalpal and copious purulent fluid was drained. A wound culture was not obtained. The wound  was not irrigated and was not packed with iodoform gauze. The wound was then covered with a sterile dressing. Patient tolerated the procedure well. MDM  Number of Diagnoses or Management Options  Groin abscess  Diagnosis management comments: Patient seen and examined. Patient has sizable abscess in left groin.   I&D was performed and patient had significant improvement once I&D was performed. He was discharged with antibiotic recommend follow-up with general surgery who had seen previously for a perianal abscess. Patient felt safe for discharge with outpatient follow-up.             --------------------------------------------- PAST HISTORY ---------------------------------------------  Past Medical History:  has no past medical history on file. Past Surgical History:  has a past surgical history that includes Anus surgery (N/A, 9/18/2020). Social History:  reports that he has been smoking cigarettes. He has never used smokeless tobacco. He reports current alcohol use. He reports current drug use. Drug: Marijuana. Family History: family history is not on file. The patients home medications have been reviewed. Allergies: Vancomycin    -------------------------------------------------- RESULTS -------------------------------------------------  Labs:  No results found for this visit on 09/13/21. Radiology:  No orders to display       ------------------------- NURSING NOTES AND VITALS REVIEWED ---------------------------  Date / Time Roomed:  9/13/2021 12:12 PM  ED Bed Assignment:  20/20    The nursing notes within the ED encounter and vital signs as below have been reviewed. BP (!) 180/103   Pulse 78   Temp 97.1 °F (36.2 °C) (Temporal)   Resp 14   Ht 6' 4\" (1.93 m)   Wt 280 lb (127 kg)   SpO2 98%   BMI 34.08 kg/m²   Oxygen Saturation Interpretation: Normal      ------------------------------------------ PROGRESS NOTES ------------------------------------------  I have spoken with the patient and discussed todays results, in addition to providing specific details for the plan of care and counseling regarding the diagnosis and prognosis. Their questions are answered at this time and they are agreeable with the plan. I discussed at length with them reasons for immediate return here for re evaluation.  They will followup with their primary care physician by calling their office on Monday.      --------------------------------- ADDITIONAL PROVIDER NOTES ---------------------------------  At this time the patient is without objective evidence of an acute process requiring hospitalization or inpatient management. They have remained hemodynamically stable throughout their entire ED visit and are stable for discharge with outpatient follow-up. The plan has been discussed in detail and they are aware of the specific conditions for emergent return, as well as the importance of follow-up. New Prescriptions    SULFAMETHOXAZOLE-TRIMETHOPRIM (BACTRIM DS) 800-160 MG PER TABLET    Take 2 tablets by mouth 2 times daily for 10 days       Diagnosis:  1. Groin abscess        Disposition:  Patient's disposition: Discharge to home  Patient's condition is stable.        Annie Cabrera DO  09/13/21 1380

## 2022-03-10 ENCOUNTER — HOSPITAL ENCOUNTER (EMERGENCY)
Age: 32
Discharge: HOME | End: 2022-03-10
Payer: COMMERCIAL

## 2022-03-10 VITALS
HEART RATE: 56 BPM | BODY MASS INDEX: 35.92 KG/M2 | HEIGHT: 76 IN | SYSTOLIC BLOOD PRESSURE: 182 MMHG | TEMPERATURE: 98.8 F | WEIGHT: 295 LBS | OXYGEN SATURATION: 98 % | RESPIRATION RATE: 18 BRPM | DIASTOLIC BLOOD PRESSURE: 94 MMHG

## 2022-03-10 DIAGNOSIS — K02.9 DENTAL CARIES: ICD-10-CM

## 2022-03-10 DIAGNOSIS — K08.89 PAIN, DENTAL: Primary | ICD-10-CM

## 2022-03-10 PROCEDURE — 6370000000 HC RX 637 (ALT 250 FOR IP): Performed by: PHYSICIAN ASSISTANT

## 2022-03-10 PROCEDURE — 99283 EMERGENCY DEPT VISIT LOW MDM: CPT

## 2022-03-10 RX ORDER — AMOXICILLIN 250 MG/1
500 CAPSULE ORAL ONCE
Status: COMPLETED | OUTPATIENT
Start: 2022-03-10 | End: 2022-03-10

## 2022-03-10 RX ORDER — IBUPROFEN 600 MG/1
600 TABLET ORAL ONCE
Status: COMPLETED | OUTPATIENT
Start: 2022-03-10 | End: 2022-03-10

## 2022-03-10 RX ORDER — AMOXICILLIN 500 MG/1
500 CAPSULE ORAL 3 TIMES DAILY
Qty: 30 CAPSULE | Refills: 0 | Status: SHIPPED | OUTPATIENT
Start: 2022-03-10 | End: 2022-03-20

## 2022-03-10 RX ORDER — IBUPROFEN 600 MG/1
600 TABLET ORAL 3 TIMES DAILY PRN
Qty: 30 TABLET | Refills: 0 | Status: SHIPPED | OUTPATIENT
Start: 2022-03-10

## 2022-03-10 RX ADMIN — IBUPROFEN 600 MG: 600 TABLET, FILM COATED ORAL at 22:49

## 2022-03-10 RX ADMIN — AMOXICILLIN 500 MG: 250 CAPSULE ORAL at 22:49

## 2022-03-10 ASSESSMENT — PAIN SCALES - GENERAL
PAINLEVEL_OUTOF10: 6
PAINLEVEL_OUTOF10: 10

## 2022-03-10 ASSESSMENT — PAIN DESCRIPTION - DESCRIPTORS: DESCRIPTORS: ACHING

## 2022-03-10 ASSESSMENT — PAIN DESCRIPTION - LOCATION: LOCATION: TEETH

## 2022-03-10 ASSESSMENT — PAIN DESCRIPTION - PAIN TYPE: TYPE: ACUTE PAIN

## 2022-03-10 ASSESSMENT — PAIN DESCRIPTION - FREQUENCY: FREQUENCY: CONTINUOUS

## 2022-03-11 NOTE — ED PROVIDER NOTES
[facility]                                                                                                                                      Department of Emergency Medicine   ED  Provider Note  Admit Date/RoomTime: 3/10/2022 10:18 PM  ED Room: [facility]        HPI:  3/10/22,   Time: 10:45 PM MERNA Serrato is a 28 y.o. male presenting to the ED for right lower dental pain, beginning few days ago. The complaint has been persistent, moderate in severity, and worsened by talking and chewing. The patient presents today with right lower dental pain. Has significant and diffuse caries throughout. States he is not on any abs. Took Vicodin earlier today for pain. Denies swelling, fever or vomiting. Pt reports he has appointment with dentist on Monday. ROS:     Constitutional: Negative for fever and chills  HENT:  See HPI  Eyes: Negative for pain, discharge and redness  Respiratory:  Negative for shortness of breath, cough and wheezing  Cardiovascular: Negative for CP, edema or palpitations  Gastrointestinal: Negative for nausea, vomiting, diarrhea and abdominal distention  Genitourinary: Negative for dysuria and frequency  Musculoskeletal: Negative for back pain and arthralgia  Skin: Negative for rash and wound  Neurological: Negative for weakness and headaches  Hematological: Negative for adenopathy    All other systems reviewed and are negative      -------------------------------- PAST HISTORY ----------------------------------  Past Medical History:  has no past medical history on file. Past Surgical History:  has a past surgical history that includes Anus surgery (N/A, 9/18/2020). Social History:  reports that he has been smoking cigarettes. He has never used smokeless tobacco. He reports current alcohol use. He reports current drug use. Drug: Marijuana Gelene Chasten). Family History: family history is not on file.      The patients home medications have been reviewed. Allergies: Vancomycin    --------------------------------- RESULTS ------------------------------------------  All laboratory and radiology results have been personally reviewed by myself   LABS:  No results found for this visit on 03/10/22. RADIOLOGY:  Interpreted by Radiologist.  No orders to display       ----------------- NURSING NOTES AND VITALS REVIEWED ---------------   The nursing notes within the ED encounter and vital signs as below have been reviewed. BP (!) 200/120   Pulse 56   Temp 98.8 °F (37.1 °C) (Temporal)   Resp 18   Ht 6' 4\" (1.93 m)   Wt 295 lb (133.8 kg)   SpO2 98%   BMI 35.91 kg/m²   Oxygen Saturation Interpretation: Normal      --------------------------------PHYSICAL EXAM------------------------------------      Constitutional/General: Alert and oriented x3, well appearing, non toxic in NAD  Head: NC/AT  Eyes: PERRL, EOMI  Mouth: Severe and diffuse caries noted throughout. Multiple teeth have avulsed to the gum line. No obvious abscess. No trismus. Neck: Supple, full ROM, no meningeal signs  Pulmonary: Lungs clear to auscultation bilaterally, no wheezes, rales, or rhonchi. Not in respiratory distress  Cardiovascular:  Regular rate and rhythm, no murmurs, gallops, or rubs. 2+ distal pulses  Extremities: Moves all extremities x 4. Warm and well perfused  Skin: warm and dry without rash  Neurologic: GCS 15,  Intact. No focal deficits  Psych: Normal Affect      ------------------------ ED COURSE/MEDICAL DECISION MAKING----------------------  Medications   amoxicillin (AMOXIL) capsule 500 mg (500 mg Oral Given 3/10/22 2249)   ibuprofen (ADVIL;MOTRIN) tablet 600 mg (600 mg Oral Given 3/10/22 2249)         Medical Decision Making:    Pt has severe dental decay with pain. No obvious abscess. Has appointment with dentist on Monday. Plan Amoxil and Motrin here and for home. F/U dental clinic as planned Monday.    Pt aware and agreeable to plan Counseling: The emergency provider has spoken with the patient and discussed todays results, in addition to providing specific details for the plan of care and counseling regarding the diagnosis and prognosis. Questions are answered at this time and they are agreeable with the plan.      ------------------------ IMPRESSION AND DISPOSITION -------------------------------    IMPRESSION  1. Pain, dental    2.  Dental caries        DISPOSITION  Disposition: Discharge to home  Patient condition is stable                   Wes Guadalupe PA-C  03/10/22 5184

## 2022-05-10 ENCOUNTER — APPOINTMENT (OUTPATIENT)
Dept: CT IMAGING | Age: 32
End: 2022-05-10
Payer: COMMERCIAL

## 2022-05-10 ENCOUNTER — HOSPITAL ENCOUNTER (EMERGENCY)
Age: 32
Discharge: HOME | End: 2022-05-10
Payer: COMMERCIAL

## 2022-05-10 VITALS
RESPIRATION RATE: 16 BRPM | DIASTOLIC BLOOD PRESSURE: 97 MMHG | BODY MASS INDEX: 34.69 KG/M2 | WEIGHT: 285 LBS | TEMPERATURE: 97.3 F | SYSTOLIC BLOOD PRESSURE: 186 MMHG | OXYGEN SATURATION: 96 % | HEART RATE: 100 BPM

## 2022-05-10 DIAGNOSIS — L02.91 ABSCESS: Primary | ICD-10-CM

## 2022-05-10 LAB
ALBUMIN SERPL-MCNC: 4.6 G/DL (ref 3.5–5.2)
ALP BLD-CCNC: 96 U/L (ref 40–129)
ALT SERPL-CCNC: 38 U/L (ref 0–40)
ANION GAP SERPL CALCULATED.3IONS-SCNC: 7 MMOL/L (ref 7–16)
AST SERPL-CCNC: 36 U/L (ref 0–39)
BASOPHILS ABSOLUTE: 0.04 E9/L (ref 0–0.2)
BASOPHILS RELATIVE PERCENT: 0.4 % (ref 0–2)
BILIRUB SERPL-MCNC: 0.2 MG/DL (ref 0–1.2)
BUN BLDV-MCNC: 16 MG/DL (ref 6–20)
CALCIUM SERPL-MCNC: 10 MG/DL (ref 8.6–10.2)
CHLORIDE BLD-SCNC: 103 MMOL/L (ref 98–107)
CO2: 27 MMOL/L (ref 22–29)
CREAT SERPL-MCNC: 1.4 MG/DL (ref 0.7–1.2)
EOSINOPHILS ABSOLUTE: 0.12 E9/L (ref 0.05–0.5)
EOSINOPHILS RELATIVE PERCENT: 1.2 % (ref 0–6)
GFR AFRICAN AMERICAN: >60
GFR NON-AFRICAN AMERICAN: >60 ML/MIN/1.73
GLUCOSE BLD-MCNC: 94 MG/DL (ref 74–99)
HCT VFR BLD CALC: 46.4 % (ref 37–54)
HEMOGLOBIN: 16 G/DL (ref 12.5–16.5)
IMMATURE GRANULOCYTES #: 0.03 E9/L
IMMATURE GRANULOCYTES %: 0.3 % (ref 0–5)
LACTIC ACID: 0.6 MMOL/L (ref 0.5–2.2)
LYMPHOCYTES ABSOLUTE: 2.9 E9/L (ref 1.5–4)
LYMPHOCYTES RELATIVE PERCENT: 27.8 % (ref 20–42)
MCH RBC QN AUTO: 30.8 PG (ref 26–35)
MCHC RBC AUTO-ENTMCNC: 34.5 % (ref 32–34.5)
MCV RBC AUTO: 89.2 FL (ref 80–99.9)
MONOCYTES ABSOLUTE: 0.74 E9/L (ref 0.1–0.95)
MONOCYTES RELATIVE PERCENT: 7.1 % (ref 2–12)
NEUTROPHILS ABSOLUTE: 6.59 E9/L (ref 1.8–7.3)
NEUTROPHILS RELATIVE PERCENT: 63.2 % (ref 43–80)
PDW BLD-RTO: 12.8 FL (ref 11.5–15)
PLATELET # BLD: 177 E9/L (ref 130–450)
PMV BLD AUTO: 10.5 FL (ref 7–12)
POTASSIUM REFLEX MAGNESIUM: 4.8 MMOL/L (ref 3.5–5)
RBC # BLD: 5.2 E12/L (ref 3.8–5.8)
SODIUM BLD-SCNC: 137 MMOL/L (ref 132–146)
TOTAL PROTEIN: 7.6 G/DL (ref 6.4–8.3)
WBC # BLD: 10.4 E9/L (ref 4.5–11.5)

## 2022-05-10 PROCEDURE — 10060 I&D ABSCESS SIMPLE/SINGLE: CPT

## 2022-05-10 PROCEDURE — 6360000004 HC RX CONTRAST MEDICATION: Performed by: RADIOLOGY

## 2022-05-10 PROCEDURE — 2500000003 HC RX 250 WO HCPCS

## 2022-05-10 PROCEDURE — 83605 ASSAY OF LACTIC ACID: CPT

## 2022-05-10 PROCEDURE — 80053 COMPREHEN METABOLIC PANEL: CPT

## 2022-05-10 PROCEDURE — 85025 COMPLETE CBC W/AUTO DIFF WBC: CPT

## 2022-05-10 PROCEDURE — 87040 BLOOD CULTURE FOR BACTERIA: CPT

## 2022-05-10 PROCEDURE — 99285 EMERGENCY DEPT VISIT HI MDM: CPT

## 2022-05-10 PROCEDURE — 87205 SMEAR GRAM STAIN: CPT

## 2022-05-10 PROCEDURE — 87070 CULTURE OTHR SPECIMN AEROBIC: CPT

## 2022-05-10 PROCEDURE — 74177 CT ABD & PELVIS W/CONTRAST: CPT

## 2022-05-10 PROCEDURE — 6370000000 HC RX 637 (ALT 250 FOR IP)

## 2022-05-10 RX ORDER — SULFAMETHOXAZOLE AND TRIMETHOPRIM 800; 160 MG/1; MG/1
1 TABLET ORAL ONCE
Status: COMPLETED | OUTPATIENT
Start: 2022-05-10 | End: 2022-05-10

## 2022-05-10 RX ORDER — SODIUM CHLORIDE 0.9 % (FLUSH) 0.9 %
10 SYRINGE (ML) INJECTION PRN
Status: DISCONTINUED | OUTPATIENT
Start: 2022-05-10 | End: 2022-05-10 | Stop reason: HOSPADM

## 2022-05-10 RX ORDER — LIDOCAINE HYDROCHLORIDE 10 MG/ML
20 INJECTION, SOLUTION EPIDURAL; INFILTRATION; INTRACAUDAL; PERINEURAL ONCE
Status: DISCONTINUED | OUTPATIENT
Start: 2022-05-10 | End: 2022-05-10 | Stop reason: HOSPADM

## 2022-05-10 RX ORDER — CEPHALEXIN 500 MG/1
500 CAPSULE ORAL ONCE
Status: COMPLETED | OUTPATIENT
Start: 2022-05-10 | End: 2022-05-10

## 2022-05-10 RX ORDER — SULFAMETHOXAZOLE AND TRIMETHOPRIM 800; 160 MG/1; MG/1
1 TABLET ORAL 2 TIMES DAILY
Qty: 14 TABLET | Refills: 0 | Status: SHIPPED | OUTPATIENT
Start: 2022-05-10 | End: 2022-05-17

## 2022-05-10 RX ORDER — CEPHALEXIN 500 MG/1
500 CAPSULE ORAL 4 TIMES DAILY
Qty: 28 CAPSULE | Refills: 0 | Status: SHIPPED | OUTPATIENT
Start: 2022-05-10 | End: 2022-05-17

## 2022-05-10 RX ORDER — LIDOCAINE HYDROCHLORIDE 10 MG/ML
INJECTION, SOLUTION INFILTRATION; PERINEURAL
Status: COMPLETED
Start: 2022-05-10 | End: 2022-05-10

## 2022-05-10 RX ADMIN — IOPAMIDOL 75 ML: 755 INJECTION, SOLUTION INTRAVENOUS at 19:43

## 2022-05-10 RX ADMIN — SULFAMETHOXAZOLE AND TRIMETHOPRIM 1 TABLET: 800; 160 TABLET ORAL at 20:50

## 2022-05-10 RX ADMIN — CEPHALEXIN 500 MG: 500 CAPSULE ORAL at 20:50

## 2022-05-10 RX ADMIN — LIDOCAINE HYDROCHLORIDE 200 MG: 10 INJECTION, SOLUTION INFILTRATION; PERINEURAL at 20:50

## 2022-05-10 ASSESSMENT — PAIN - FUNCTIONAL ASSESSMENT: PAIN_FUNCTIONAL_ASSESSMENT: NONE - DENIES PAIN

## 2022-05-10 NOTE — ED PROVIDER NOTES
[address] [facility] / [facility]  Department of Emergency Medicine   ED  Encounter Note  Admit Date/RoomTime: 5/10/2022  3:49 PM  ED Room:     NAME: Wallace Abreu  : 1990  MRN: [id number]     Chief Complaint:  Abscess (perirectal- hx of and had surgery in past )    History of Present Illness        Wallace Abreu is a 28 y.o. old male who presents to the emergency department by private vehicle, for gradual onset tender area on inner left buttocks radiating towards the anus, which occured 2 day(s) prior to arrival.  Since onset the symptoms have been persistent and mild-moderate in severity. Symptoms are associated with increasing pain and no drainage. He has a history of perirectal abscess. Patient stated that he has recurrent episodes of abscesses in this area. He denies fevers or chills. Denies chest pain, shortness of breath, or abdominal pain. Denies any dysuria or hematuria. Nausea, vomiting or diarrhea. Patient states that he did not have any antibiotics at home, and he wanted to be evaluated before the abscess got too bad. Patient appears well, nontoxic and in no acute distress upon arrival to the department. He has no other complaints or concerns at this time. ROS   Pertinent positives and negatives are stated within HPI, all other systems reviewed and are negative. Past Medical History:  has no past medical history on file. Surgical History:  has a past surgical history that includes Anus surgery (N/A, 2020). Social History:  reports that he has been smoking cigarettes. He has never used smokeless tobacco. He reports current alcohol use. He reports current drug use. Drug: Marijuana Evie Dinning). Family History: family history is not on file.      Allergies: Vancomycin    Physical Exam   Oxygen Saturation Interpretation: Normal.        ED Triage Vitals [05/10/22 1540]   BP Temp Temp src Pulse Resp SpO2 Height Weight   (!) 183/94 97.3 °F (36.3 °C) -- 85 16 99 % -- 285 lb (129.3 kg)         Physical Exam  Constitutional:  Alert, development consistent with age. HEENT:  NC/NT. Airway patent  Neck:  Normal ROM. Supple. Respiratory:  Clear to auscultation and breath sounds equal.  CV:  Regular rate and rhythm, normal heart sounds, without pathological murmurs, ectopy, gallops, or rubs. GI:  Abdomen Soft, nontender, good bowel sounds. No firm or pulsatile mass. No guarding. No rebound. Back:  No costovertebral tenderness. Extremities: No tenderness or edema noted. Integument:  Normal turgor. Warm, dry. abscess formation measuring approx. 3 cm x 2 cm, tenderness, warmth and fluctuance noted to the left inner buttocks extending towards the anus. Lymphatics: No lymphangitis or adenopathy noted. Neurological:  Oriented. Motor functions intact.      Lab / Imaging Results   (All laboratory and radiology results have been personally reviewed by myself)  Labs:  Results for orders placed or performed during the hospital encounter of 05/10/22   CBC with Auto Differential   Result Value Ref Range    WBC 10.4 4.5 - 11.5 E9/L    RBC 5.20 3.80 - 5.80 E12/L    Hemoglobin 16.0 12.5 - 16.5 g/dL    Hematocrit 46.4 37.0 - 54.0 %    MCV 89.2 80.0 - 99.9 fL    MCH 30.8 26.0 - 35.0 pg    MCHC 34.5 32.0 - 34.5 %    RDW 12.8 11.5 - 15.0 fL    Platelets 771 196 - 920 E9/L    MPV 10.5 7.0 - 12.0 fL    Neutrophils % 63.2 43.0 - 80.0 %    Immature Granulocytes % 0.3 0.0 - 5.0 %    Lymphocytes % 27.8 20.0 - 42.0 %    Monocytes % 7.1 2.0 - 12.0 %    Eosinophils % 1.2 0.0 - 6.0 %    Basophils % 0.4 0.0 - 2.0 %    Neutrophils Absolute 6.59 1.80 - 7.30 E9/L    Immature Granulocytes # 0.03 E9/L    Lymphocytes Absolute 2.90 1.50 - 4.00 E9/L    Monocytes Absolute 0.74 0.10 - 0.95 E9/L    Eosinophils Absolute 0.12 0.05 - 0.50 E9/L    Basophils Absolute 0.04 0.00 - 0.20 E9/L   Comprehensive Metabolic Panel w/ Reflex to MG   Result Value Ref Range    Sodium 137 132 - 146 mmol/L    Potassium reflex Magnesium 4.8 3.5 - 5.0 mmol/L    Chloride 103 98 - 107 mmol/L    CO2 27 22 - 29 mmol/L    Anion Gap 7 7 - 16 mmol/L    Glucose 94 74 - 99 mg/dL    BUN 16 6 - 20 mg/dL    CREATININE 1.4 (H) 0.7 - 1.2 mg/dL    GFR Non-African American >60 >=60 mL/min/1.73    GFR African American >60     Calcium 10.0 8.6 - 10.2 mg/dL    Total Protein 7.6 6.4 - 8.3 g/dL    Albumin 4.6 3.5 - 5.2 g/dL    Total Bilirubin 0.2 0.0 - 1.2 mg/dL    Alkaline Phosphatase 96 40 - 129 U/L    ALT 38 0 - 40 U/L    AST 36 0 - 39 U/L   Lactic Acid   Result Value Ref Range    Lactic Acid 0.6 0.5 - 2.2 mmol/L     Imaging: All Radiology results interpreted by Radiologist unless otherwise noted. CT ABDOMEN PELVIS W IV CONTRAST Additional Contrast? None   Final Result   1. No acute abnormality is seen in the abdomen or the pelvis. 2. No evidence of a perianal or perirectal abscess. .   3. 2.5 cm hyperdense exophytic lesion along the upper pole of the right   kidney which likely represents a complex cyst.  A solid lesion is less likely   but not excluded. Initial evaluation with sonography is recommended. RECOMMENDATIONS:   Unavailable             ED Course / Medical Decision Making     Medications   sodium chloride flush 0.9 % injection 10 mL (has no administration in time range)   lidocaine PF 1 % injection 20 mL (20 mLs IntraDERmal Not Given 5/10/22 2101)   iopamidol (ISOVUE-370) 76 % injection 75 mL (75 mLs IntraVENous Given 5/10/22 1943)   lidocaine 1 % injection (200 mg  Given 5/10/22 2050)   sulfamethoxazole-trimethoprim (BACTRIM DS;SEPTRA DS) 800-160 MG per tablet 1 tablet (1 tablet Oral Given 5/10/22 2050)   cephALEXin (KEFLEX) capsule 500 mg (500 mg Oral Given 5/10/22 2050)        Re-examination:  5/10/22       Time: 2050  Patients condition remains stable.     Consult(s):   None    Procedure(s):  PROCEDURE  5/10/22       Time: 2035    INCISION AND DRAINAGE  Risks, benefits and alternatives (for applicable procedures below) described. Performed By: LOLA Bustillo CNP. Indication: Abscess located on right buttocks extending towards the anus. Informed consent obtained: The patient provided verbal consent for this procedure. .  Prep: The skin was cleansed with povidone iodine and draped in a sterile fashion. Local Anesthesia:  obtained with Lidocaine 1% without epinephrine. Incision: The Abscess located on inner right buttocks was Incised by scalpal and minimal, serosanguinous, bloody fluid was drained. A wound culture was obtained. The wound  was irrigated and was not packed with iodoform gauze. The wound was then covered with a sterile dressing. Patient tolerated the procedure well. MDM:   Patient presents to the emergency department for reoccurring abscess noted to the perineum that is extending towards the anus upon exam.  Blood work and imaging were obtained. Work showed no evidence of a leukocytosis, anemia, or electrolyte imbalances. Patient's creatinine was slightly elevated at 1.4 which appears to be at patient's baseline. Lactic acid was normal.  CT of abdomen and pelvis shows no acute abnormalities seen in the abdomen or the pelvis. No evidence of a perianal or perirectal abscess. 2.5 cm hyperdense exophytic lesion along the upper pole of the right kidney [place] represents a complex cyst.  A solid lesion is less likely but not excluded. These factors are to the patient, and he verbalized understanding. Patient advised to follow-up with primary care in regard to the CT findings. Patient is agreeable to this plan. CT scan of abdomen and pelvis showed. Vital signs remained stable while in the department. Patient is afebrile, not hypoxic or tachycardic. Abscess was I&D  as per procedure note above. Plan is for outpatient management with antibiotics, and follow-up with PCP. Patient advised to take the entirety of the antibiotics. Patient is agreeable to this plan.   At this time the patient is without objective evidence of an acute process requiring hospitalization or inpatient management. They have remained hemodynamically stable throughout their entire ED visit and are stable for discharge with outpatient follow-up. The plan has been discussed in detail and they are aware of the specific conditions for emergent return, as well as the importance of follow-up. Plan of Care/Counseling:  LOLA Funes CNP reviewed today's visit with the patient in addition to providing specific details for the plan of care and counseling regarding the diagnosis and prognosis. Questions are answered at this time and are agreeable with the plan. Assessment      1. Abscess New Problem     Plan   Discharged home  Patient condition is good    New Medications     Discharge Medication List as of 5/10/2022  8:53 PM      START taking these medications    Details   sulfamethoxazole-trimethoprim (BACTRIM DS;SEPTRA DS) 800-160 MG per tablet Take 1 tablet by mouth 2 times daily for 7 days, Disp-14 tablet, R-0Normal      cephALEXin (KEFLEX) 500 MG capsule Take 1 capsule by mouth 4 times daily for 7 days, Disp-28 capsule, R-0Normal           Electronically signed by LOLA Funes CNP   DD: 5/10/22  **This report was transcribed using voice recognition software. Every effort was made to ensure accuracy; however, inadvertent computerized transcription errors may be present.   END OF ED PROVIDER NOTE     LOLA Funes CNP  05/10/22 4878

## 2022-05-13 LAB
GRAM STAIN RESULT: NORMAL
WOUND/ABSCESS: NORMAL

## 2022-05-15 LAB — BLOOD CULTURE, ROUTINE: NORMAL

## (undated) DEVICE — JELLY,LUBE,STERILE,FLIP TOP,TUBE,2-OZ: Brand: MEDLINE

## (undated) DEVICE — GAUZE,SPONGE,4"X4",8PLY,STRL,LF,10/TRAY: Brand: MEDLINE

## (undated) DEVICE — ELECTRODE PT RET AD L9FT HI MOIST COND ADH HYDRGEL CORDED

## (undated) DEVICE — 4-PORT MANIFOLD: Brand: NEPTUNE 2

## (undated) DEVICE — PACK PROCEDURE SURG GEN CUST

## (undated) DEVICE — 1 ML TUBERCULIN SYRINGE,DETACHABLE NEEDLE: Brand: MONOJECT

## (undated) DEVICE — SOLUTION IV IRRIG POUR BRL 0.9% SODIUM CHL 2F7124

## (undated) DEVICE — NEEDLE HYPO 25GA L1.5IN BLU POLYPR HUB S STL REG BVL STR

## (undated) DEVICE — BLADE ES ELASTOMERIC COAT INSUL DURABLE BEND UPTO 90DEG

## (undated) DEVICE — GLOVE ORANGE PI 8   MSG9080

## (undated) DEVICE — Device

## (undated) DEVICE — SHEET, T, LAPAROTOMY, STERILE: Brand: MEDLINE

## (undated) DEVICE — TAPE,WATERPROOF,CURAD,2"X10YD,LF,72/CS: Brand: CURAD

## (undated) DEVICE — CONTROL SYRINGE LUER-LOCK TIP: Brand: MONOJECT

## (undated) DEVICE — READY WET SKIN SCRUB TRAY-LF: Brand: MEDLINE INDUSTRIES, INC.

## (undated) DEVICE — DRESSING,GAUZE,XEROFORM,CURAD,5"X9",ST: Brand: CURAD

## (undated) DEVICE — TOWEL,OR,DSP,ST,BLUE,STD,6/PK,12PK/CS: Brand: MEDLINE

## (undated) DEVICE — DOUBLE BASIN SET: Brand: MEDLINE INDUSTRIES, INC.

## (undated) DEVICE — PAD,ABDOMINAL,5"X9",ST,LF,25/BX: Brand: MEDLINE INDUSTRIES, INC.

## (undated) DEVICE — ANTISEPTIC 16OZ H PEROX 1ST AID ORAL DEBRIDING AGNT

## (undated) DEVICE — GAUZE,PACKING STRIP,IODOFORM,1/2"X5YD,ST: Brand: CURAD

## (undated) DEVICE — TAPE ADH CLTH SILK H2O REPELLENT CURAD